# Patient Record
Sex: MALE | Race: BLACK OR AFRICAN AMERICAN | NOT HISPANIC OR LATINO | Employment: UNEMPLOYED | URBAN - METROPOLITAN AREA
[De-identification: names, ages, dates, MRNs, and addresses within clinical notes are randomized per-mention and may not be internally consistent; named-entity substitution may affect disease eponyms.]

---

## 2023-02-23 ENCOUNTER — OFFICE VISIT (OUTPATIENT)
Dept: FAMILY MEDICINE CLINIC | Facility: CLINIC | Age: 53
End: 2023-02-23

## 2023-02-23 ENCOUNTER — APPOINTMENT (OUTPATIENT)
Dept: LAB | Facility: CLINIC | Age: 53
End: 2023-02-23

## 2023-02-23 VITALS
RESPIRATION RATE: 18 BRPM | HEART RATE: 104 BPM | WEIGHT: 217.4 LBS | OXYGEN SATURATION: 98 % | HEIGHT: 74 IN | SYSTOLIC BLOOD PRESSURE: 130 MMHG | BODY MASS INDEX: 27.9 KG/M2 | DIASTOLIC BLOOD PRESSURE: 90 MMHG | TEMPERATURE: 97.4 F

## 2023-02-23 DIAGNOSIS — F51.01 PRIMARY INSOMNIA: ICD-10-CM

## 2023-02-23 DIAGNOSIS — Z00.00 ANNUAL PHYSICAL EXAM: Primary | ICD-10-CM

## 2023-02-23 DIAGNOSIS — Z13.220 SCREENING CHOLESTEROL LEVEL: ICD-10-CM

## 2023-02-23 DIAGNOSIS — F41.8 ANXIETY WITH DEPRESSION: ICD-10-CM

## 2023-02-23 DIAGNOSIS — Z13.29 THYROID DISORDER SCREENING: ICD-10-CM

## 2023-02-23 DIAGNOSIS — Z13.1 DIABETES MELLITUS SCREENING: ICD-10-CM

## 2023-02-23 DIAGNOSIS — Z11.59 NEED FOR HEPATITIS C SCREENING TEST: ICD-10-CM

## 2023-02-23 DIAGNOSIS — Z11.4 SCREENING FOR HIV (HUMAN IMMUNODEFICIENCY VIRUS): ICD-10-CM

## 2023-02-23 DIAGNOSIS — Z12.11 COLON CANCER SCREENING: ICD-10-CM

## 2023-02-23 DIAGNOSIS — Z12.5 PROSTATE CANCER SCREENING: ICD-10-CM

## 2023-02-23 DIAGNOSIS — Z00.00 ANNUAL PHYSICAL EXAM: ICD-10-CM

## 2023-02-23 LAB
ALBUMIN SERPL BCP-MCNC: 3.8 G/DL (ref 3.5–5)
ALP SERPL-CCNC: 57 U/L (ref 46–116)
ALT SERPL W P-5'-P-CCNC: 24 U/L (ref 12–78)
ANION GAP SERPL CALCULATED.3IONS-SCNC: 4 MMOL/L (ref 4–13)
AST SERPL W P-5'-P-CCNC: 22 U/L (ref 5–45)
BASOPHILS # BLD AUTO: 0.05 THOUSANDS/ÂΜL (ref 0–0.1)
BASOPHILS NFR BLD AUTO: 1 % (ref 0–1)
BILIRUB SERPL-MCNC: 0.24 MG/DL (ref 0.2–1)
BUN SERPL-MCNC: 7 MG/DL (ref 5–25)
CALCIUM SERPL-MCNC: 9.7 MG/DL (ref 8.3–10.1)
CHLORIDE SERPL-SCNC: 109 MMOL/L (ref 96–108)
CHOLEST SERPL-MCNC: 199 MG/DL
CO2 SERPL-SCNC: 25 MMOL/L (ref 21–32)
CREAT SERPL-MCNC: 1.1 MG/DL (ref 0.6–1.3)
EOSINOPHIL # BLD AUTO: 0.27 THOUSAND/ÂΜL (ref 0–0.61)
EOSINOPHIL NFR BLD AUTO: 6 % (ref 0–6)
ERYTHROCYTE [DISTWIDTH] IN BLOOD BY AUTOMATED COUNT: 13.2 % (ref 11.6–15.1)
EST. AVERAGE GLUCOSE BLD GHB EST-MCNC: 117 MG/DL
GFR SERPL CREATININE-BSD FRML MDRD: 76 ML/MIN/1.73SQ M
GLUCOSE P FAST SERPL-MCNC: 137 MG/DL (ref 65–99)
HBA1C MFR BLD: 5.7 %
HCT VFR BLD AUTO: 46.5 % (ref 36.5–49.3)
HCV AB SER QL: NORMAL
HDLC SERPL-MCNC: 55 MG/DL
HGB BLD-MCNC: 14.6 G/DL (ref 12–17)
HIV 1+2 AB+HIV1 P24 AG SERPL QL IA: NORMAL
HIV 2 AB SERPL QL IA: NORMAL
HIV1 AB SERPL QL IA: NORMAL
HIV1 P24 AG SERPL QL IA: NORMAL
IMM GRANULOCYTES # BLD AUTO: 0.01 THOUSAND/UL (ref 0–0.2)
IMM GRANULOCYTES NFR BLD AUTO: 0 % (ref 0–2)
LDLC SERPL CALC-MCNC: 104 MG/DL (ref 0–100)
LYMPHOCYTES # BLD AUTO: 2.18 THOUSANDS/ÂΜL (ref 0.6–4.47)
LYMPHOCYTES NFR BLD AUTO: 46 % (ref 14–44)
MCH RBC QN AUTO: 26.9 PG (ref 26.8–34.3)
MCHC RBC AUTO-ENTMCNC: 31.4 G/DL (ref 31.4–37.4)
MCV RBC AUTO: 86 FL (ref 82–98)
MONOCYTES # BLD AUTO: 0.42 THOUSAND/ÂΜL (ref 0.17–1.22)
MONOCYTES NFR BLD AUTO: 9 % (ref 4–12)
NEUTROPHILS # BLD AUTO: 1.77 THOUSANDS/ÂΜL (ref 1.85–7.62)
NEUTS SEG NFR BLD AUTO: 38 % (ref 43–75)
NONHDLC SERPL-MCNC: 144 MG/DL
NRBC BLD AUTO-RTO: 0 /100 WBCS
PLATELET # BLD AUTO: 244 THOUSANDS/UL (ref 149–390)
PMV BLD AUTO: 11.1 FL (ref 8.9–12.7)
POTASSIUM SERPL-SCNC: 3.9 MMOL/L (ref 3.5–5.3)
PROT SERPL-MCNC: 8 G/DL (ref 6.4–8.4)
PSA SERPL-MCNC: 0.8 NG/ML (ref 0–4)
RBC # BLD AUTO: 5.43 MILLION/UL (ref 3.88–5.62)
SODIUM SERPL-SCNC: 138 MMOL/L (ref 135–147)
TRIGL SERPL-MCNC: 200 MG/DL
TSH SERPL DL<=0.05 MIU/L-ACNC: 2.08 UIU/ML (ref 0.45–4.5)
WBC # BLD AUTO: 4.7 THOUSAND/UL (ref 4.31–10.16)

## 2023-02-23 RX ORDER — QUETIAPINE FUMARATE 100 MG/1
100 TABLET, FILM COATED ORAL
COMMUNITY

## 2023-02-23 NOTE — PROGRESS NOTES
[id number]    NAME: Soledad Palacios  AGE: 46 y o  SEX: male  : 1970     DATE: 2023     Assessment and Plan:     Problem List Items Addressed This Visit        Other    Primary insomnia    Anxiety with depression    Relevant Medications    QUEtiapine (SEROquel) 100 mg tablet   Other Visit Diagnoses     Annual physical exam    -  Primary    Relevant Orders    Comprehensive metabolic panel    CBC and differential    Colon cancer screening        Relevant Orders    Ambulatory referral for colonoscopy    Thyroid disorder screening        Relevant Orders    TSH, 3rd generation with Free T4 reflex    Screening cholesterol level        Relevant Orders    Lipid panel    Prostate cancer screening        Relevant Orders    PSA, Total Screen    Diabetes mellitus screening        Relevant Orders    Hemoglobin A1C    Need for hepatitis C screening test        Relevant Orders    Hepatitis C Antibody ([facility])    Screening for HIV (human immunodeficiency virus)        Relevant Orders    HIV 1/2 AG/AB w Reflex [facility] for 2 yr old and above        Physical exam/new patient evaluation completed in office today  Patient has been doing well on Seroquel 100 mg for sleep, anxiety/depression  Follow-up yearly blood work recommended  Immunizations and preventive care screenings were discussed with patient today  Appropriate education was printed on patient's after visit summary  Discussed risks and benefits of prostate cancer screening  We discussed the controversial history of PSA screening for prostate cancer in the [place] as well as the risk of over detection and over treatment of prostate cancer by way of PSA screening    The patient understands that PSA blood testing is an imperfect way to screen for prostate cancer and that elevated PSA levels in the blood may also be caused by infection, inflammation, prostatic trauma or manipulation, urological procedures, or by benign prostatic enlargement  The role of the digital rectal examination in prostate cancer screening was also discussed and I discussed with him that there is large interobserver variability in the findings of digital rectal examination  Counseling:  Alcohol/drug use: discussed moderation in alcohol intake, the recommendations for healthy alcohol use, and avoidance of illicit drug use  Dental Health: discussed importance of regular tooth brushing, flossing, and dental visits  Injury prevention: discussed safety/seat belts, safety helmets, smoke detectors, carbon dioxide detectors, and smoking near bedding or upholstery  Sexual health: discussed sexually transmitted diseases, partner selection, use of condoms, avoidance of unintended pregnancy, and contraceptive alternatives  Exercise: the importance of regular exercise/physical activity was discussed  Recommend exercise 3-5 times per week for at least 30 minutes  BMI Counseling: Body mass index is 28 29 kg/m²  The BMI is above normal  Nutrition recommendations include decreasing portion sizes, encouraging healthy choices of fruits and vegetables and moderation in carbohydrate intake  Exercise recommendations include moderate physical activity 150 minutes/week  Patient referred to PCP  Rationale for BMI follow-up plan is due to patient being overweight or obese  Depression Screening and Follow-up Plan: Patient was screened for depression during today's encounter  They screened negative with a PHQ-2 score of 0  Return in about 1 year (around 2/23/2024), or if symptoms worsen or fail to improve, for Recheck  Chief Complaint:     Chief Complaint   Patient presents with   • Establish Care      History of Present Illness:     Adult Annual Physical   Patient here for a comprehensive physical exam  The patient reports no problems  Diet and Physical Activity  Diet/Nutrition: well balanced diet  Exercise: 1-2 times a week on average  Depression Screening  PHQ-2/9 Depression Screening    Little interest or pleasure in doing things: 0 - not at all  Feeling down, depressed, or hopeless: 0 - not at all  PHQ-2 Score: 0  PHQ-2 Interpretation: Negative depression screen       General Health  Sleep: sleeps well  Hearing: normal - bilateral   Vision: no vision problems  Dental: regular dental visits   Health  Symptoms include: none     Review of Systems:     Review of Systems   Constitutional: Negative for chills, fatigue and fever  HENT: Negative for congestion and sore throat  Eyes: Negative for pain and visual disturbance  Respiratory: Negative for shortness of breath and wheezing  Cardiovascular: Negative for chest pain and palpitations  Gastrointestinal: Negative for abdominal pain, constipation, diarrhea, nausea and vomiting  Genitourinary: Negative for dysuria and frequency  Musculoskeletal: Negative for back pain and neck pain  Skin: Negative for color change and rash  Neurological: Negative for dizziness and headaches  Psychiatric/Behavioral: Negative for agitation and confusion  All other systems reviewed and are negative       Past Medical History:     Past Medical History:   Diagnosis Date   • Insomnia       Past Surgical History:     Past Surgical History:   Procedure Laterality Date   • KNEE SURGERY     • MANDIBLE FRACTURE SURGERY        Family History:     Family History   Problem Relation Age of Onset   • No Known Problems Mother    • No Known Problems Father       Social History:     Social History     Socioeconomic History   • Marital status: Single     Spouse name: None   • Number of children: None   • Years of education: None   • Highest education level: None   Occupational History   • None   Tobacco Use   • Smoking status: Some Days     Types: Cigars     Passive exposure: Never   • Smokeless tobacco: Never   Substance and Sexual Activity   • Alcohol use: Not Currently   • Drug use: Never   • Sexual activity: None   Other Topics Concern   • None   Social History Narrative   • None     Social Determinants of Health     Financial Resource Strain: Not on file   Food Insecurity: Not on file   Transportation Needs: Not on file   Physical Activity: Not on file   Stress: Not on file   Social Connections: Not on file   Intimate Partner Violence: Not on file   Housing Stability: Not on file      Current Medications:     Current Outpatient Medications   Medication Sig Dispense Refill   • QUEtiapine (SEROquel) 100 mg tablet Take 100 mg by mouth daily at bedtime       No current facility-administered medications for this visit  Allergies:     No Known Allergies   Physical Exam:     /90 (BP Location: Left arm, Patient Position: Sitting, Cuff Size: Large)   Pulse 104   Temp (!) 97 4 °F (36 3 °C)   Resp 18   Ht 6' 1 5" (1 867 m)   Wt 98 6 kg (217 lb 6 4 oz)   SpO2 98%   BMI 28 29 kg/m²     Physical Exam  Vitals and nursing note reviewed  Constitutional:       General: He is not in acute distress  Appearance: He is well-developed  HENT:      Head: Normocephalic and atraumatic  Eyes:      General: No scleral icterus  Conjunctiva/sclera: Conjunctivae normal    Cardiovascular:      Rate and Rhythm: Normal rate and regular rhythm  Pulses: Normal pulses  Heart sounds: No murmur heard  Pulmonary:      Effort: Pulmonary effort is normal  No respiratory distress  Breath sounds: Normal breath sounds  Abdominal:      General: Bowel sounds are normal  There is no distension  Palpations: Abdomen is soft  Tenderness: There is no abdominal tenderness  Musculoskeletal:         General: No swelling  Normal range of motion  Cervical back: Neck supple  Skin:     General: Skin is warm and dry  Capillary Refill: Capillary refill takes less than 2 seconds  Neurological:      General: No focal deficit present        Mental Status: He is alert and oriented to person, place, and time  Mental status is at baseline     Psychiatric:         Mood and Affect: Mood normal          Behavior: Behavior normal           Shiloh Francisco DO  [address]

## 2023-07-12 ENCOUNTER — TELEPHONE (OUTPATIENT)
Dept: FAMILY MEDICINE CLINIC | Facility: CLINIC | Age: 53
End: 2023-07-12

## 2023-07-12 NOTE — TELEPHONE ENCOUNTER
Yen Dougherty called from [facility] and scheduled MARTINEZ Dr Tong Part 7/18 at 3:30. Patient is being discharged today, was admitted 7/7 for seizure. Please call patient for TCM.

## 2023-07-17 ENCOUNTER — TRANSITIONAL CARE MANAGEMENT (OUTPATIENT)
Dept: FAMILY MEDICINE CLINIC | Facility: CLINIC | Age: 53
End: 2023-07-17

## 2023-07-17 RX ORDER — AMLODIPINE BESYLATE 10 MG/1
10 TABLET ORAL DAILY
COMMUNITY

## 2023-07-17 RX ORDER — METOPROLOL SUCCINATE 25 MG/1
25 TABLET, EXTENDED RELEASE ORAL DAILY
COMMUNITY

## 2023-07-18 ENCOUNTER — OFFICE VISIT (OUTPATIENT)
Dept: FAMILY MEDICINE CLINIC | Facility: CLINIC | Age: 53
End: 2023-07-18
Payer: MEDICAID

## 2023-07-18 VITALS
BODY MASS INDEX: 24.31 KG/M2 | WEIGHT: 189.4 LBS | HEART RATE: 98 BPM | TEMPERATURE: 98.2 F | RESPIRATION RATE: 16 BRPM | SYSTOLIC BLOOD PRESSURE: 142 MMHG | HEIGHT: 74 IN | DIASTOLIC BLOOD PRESSURE: 92 MMHG

## 2023-07-18 DIAGNOSIS — F41.8 ANXIETY WITH DEPRESSION: ICD-10-CM

## 2023-07-18 DIAGNOSIS — R56.9 SEIZURE (HCC): Primary | ICD-10-CM

## 2023-07-18 DIAGNOSIS — Z12.12 ENCOUNTER FOR COLORECTAL CANCER SCREENING: ICD-10-CM

## 2023-07-18 DIAGNOSIS — I10 PRIMARY HYPERTENSION: ICD-10-CM

## 2023-07-18 DIAGNOSIS — Z12.11 ENCOUNTER FOR COLORECTAL CANCER SCREENING: ICD-10-CM

## 2023-07-18 PROCEDURE — 99495 TRANSJ CARE MGMT MOD F2F 14D: CPT | Performed by: STUDENT IN AN ORGANIZED HEALTH CARE EDUCATION/TRAINING PROGRAM

## 2023-07-18 NOTE — LETTER
July 18, 2023     Patient: Mary Sewell  YOB: 1970  Date of Visit: 7/18/2023      To Whom it May Concern:    Mary Sewell is under my professional care. Jo-Ann Beck was seen in my office on 7/18/2023. Jo-Ann Beck may return to work on 07/19/2023 . If you have any questions or concerns, please don't hesitate to call.          Sincerely,          Yasmin [place],         CC: No Recipients

## 2023-07-18 NOTE — PROGRESS NOTES
Assessment & Plan     1. Seizure [facility])  -     Ambulatory Referral to Neurology; Future    2. Encounter for colorectal cancer screening  -     Ambulatory Referral to Gastroenterology; Future    3. Anxiety with depression    4. Primary hypertension      Patient is a pleasant 61-year-old male coming in for transition of care management after being hospitalized for seizure-like activity, found down at work, elevated CK levels. Per patient MRI brain, EEG, other labs and imaging all negative. Follow-up discharge paperwork from outside facility to confirm. Patient notes no antiepileptic medication given given unremarkable work-up. Potential etiology in the setting of dehydration, patient has been drinking increased fluids and electrolytes, continue at this time. Recommend follow-up in a few months with repeat labs, gastroenterology referral for colonoscopy, referral for neurology recommended. Patient in agreement of plan. Blood pressure slightly elevated today on exam, continue amlodipine, metoprolol succinate, low-dose aspirin therapy. Subjective     Transitional Care Management Review:   Jaime Solomon is a 46 y.o. male here for TCM follow up. During the TCM phone call patient stated:  TCM Call     Date and time call was made  7/17/2023 11:12 AM    Hospital care reviewed  Discussed with Inpatient Physician    Patient was hospitialized at  Other (comment)    [facility]    Date of Admission  07/07/23    Date of discharge  07/14/23    Diagnosis  seizure    Disposition  Home    Were the patients medications reviewed and updated  Yes    Current Symptoms  None      TCM Call     Post hospital issues  None    Should patient be enrolled in anticoag monitoring? No    Scheduled for follow up?   Yes    Did you obtain your prescribed medications  Yes    Do you need help managing your prescriptions or medications  No    Is transportation to your appointment needed  No    Living Arrangements Spouse or Significiant other    Support System  Family    The type of support provided  Emotional    Do you have social support  Yes, as much as I need    Are you recieving any outpatient services  No    Are you recieving home care services  No    Are you using any community resources  No    Current waiver services  No    Have you fallen in the last 12 months  Yes    How many times  1    Interperter language line needed  No    Counseling  Family    Counseling topics  Prognosis; Diagnostic results        Transition of care management, outside facility, will obtain records. Diagnosis seizure-like activity. Patient is currently asymptomatic in office today. Review of Systems   Constitutional: Negative for chills, fatigue and fever. HENT: Negative for congestion and sore throat. Eyes: Negative for pain and visual disturbance. Respiratory: Negative for shortness of breath and wheezing. Cardiovascular: Negative for chest pain and palpitations. Gastrointestinal: Negative for abdominal pain, constipation, diarrhea, nausea and vomiting. Genitourinary: Negative for dysuria and frequency. Musculoskeletal: Negative for back pain and neck pain. Skin: Negative for color change and rash. Neurological: Negative for dizziness and headaches. Psychiatric/Behavioral: Negative for agitation and confusion. All other systems reviewed and are negative. Objective     /92 (BP Location: Left arm, Patient Position: Sitting, Cuff Size: Large)   Pulse 98   Temp 98.2 °F (36.8 °C)   Resp 16   Ht 6' 1.5" (1.867 m)   Wt 85.9 kg (189 lb 6.4 oz)   BMI 24.65 kg/m²      Physical Exam  Vitals and nursing note reviewed. Constitutional:       General: He is not in acute distress. Appearance: He is well-developed. HENT:      Head: Normocephalic and atraumatic. Eyes:      General: No scleral icterus.      Conjunctiva/sclera: Conjunctivae normal.   Cardiovascular:      Rate and Rhythm: Normal rate and regular rhythm. Heart sounds: No murmur heard. Pulmonary:      Effort: Pulmonary effort is normal. No respiratory distress. Breath sounds: Normal breath sounds. Abdominal:      General: Bowel sounds are normal. There is no distension. Palpations: Abdomen is soft. Tenderness: There is no abdominal tenderness. Musculoskeletal:         General: No swelling. Normal range of motion. Cervical back: Neck supple. Skin:     General: Skin is warm and dry. Capillary Refill: Capillary refill takes less than 2 seconds. Neurological:      General: No focal deficit present. Mental Status: He is alert and oriented to person, place, and time. Mental status is at baseline.    Psychiatric:         Mood and Affect: Mood normal.         Behavior: Behavior normal.       Medications have been reviewed by provider in current encounter    Lindsey Casey DO

## 2023-07-19 ENCOUNTER — TELEPHONE (OUTPATIENT)
Dept: ADMINISTRATIVE | Facility: OTHER | Age: 53
End: 2023-07-19

## 2023-07-19 NOTE — TELEPHONE ENCOUNTER
----- Message from Jose Smith, [facility] sent at 7/18/2023  3:35 PM EDT -----  Regarding: covid vaccines  07/18/23 3:35 PM    Hello, our patient attached above has had covid vaccine completed/performed. Please assist in [facility] [address] [facility] The date of service is 2021.     Thank you,  Meron SEAY

## 2023-07-19 NOTE — TELEPHONE ENCOUNTER
Upon review of the In Basket request we found [facility] locations in [place], [place] and [place], [place] but nothing on [address] in [place], [place]    Any additional questions or concerns should be emailed to [employer] via the appropriate education email address, please do not reply via In Basket.     Thank you  Adam Bravo

## 2023-09-28 ENCOUNTER — OFFICE VISIT (OUTPATIENT)
Dept: FAMILY MEDICINE CLINIC | Facility: CLINIC | Age: 53
End: 2023-09-28
Payer: MEDICAID

## 2023-09-28 VITALS
RESPIRATION RATE: 18 BRPM | DIASTOLIC BLOOD PRESSURE: 80 MMHG | WEIGHT: 187.8 LBS | SYSTOLIC BLOOD PRESSURE: 138 MMHG | HEIGHT: 74 IN | BODY MASS INDEX: 24.1 KG/M2 | TEMPERATURE: 98.3 F | OXYGEN SATURATION: 98 % | HEART RATE: 84 BPM

## 2023-09-28 DIAGNOSIS — F51.01 PRIMARY INSOMNIA: ICD-10-CM

## 2023-09-28 DIAGNOSIS — F41.8 ANXIETY WITH DEPRESSION: ICD-10-CM

## 2023-09-28 DIAGNOSIS — I10 PRIMARY HYPERTENSION: ICD-10-CM

## 2023-09-28 DIAGNOSIS — R55 SYNCOPE, UNSPECIFIED SYNCOPE TYPE: Primary | ICD-10-CM

## 2023-09-28 PROCEDURE — 99214 OFFICE O/P EST MOD 30 MIN: CPT | Performed by: STUDENT IN AN ORGANIZED HEALTH CARE EDUCATION/TRAINING PROGRAM

## 2023-09-28 RX ORDER — QUETIAPINE FUMARATE 50 MG/1
50 TABLET, FILM COATED ORAL
Qty: 90 TABLET | Refills: 0 | Status: SHIPPED | OUTPATIENT
Start: 2023-09-28

## 2023-09-28 NOTE — PROGRESS NOTES
Clinic Visit Note  Kathy Landa 48 y.o. male   MRN: [id number]    Assessment and Plan      Diagnoses and all orders for this visit:    Syncope, unspecified syncope type  Given symptoms, rule out cardiac etiology, cardiology evaluation appreciated, echocardiogram recommended to rule out valvular disease and EF function. Continue to stay well-hydrated, no recent symptoms.  -     Echo complete w/ contrast if indicated; Future  -     Ambulatory Referral to Cardiology; Future    Primary hypertension  Patient has been doing well on amlodipine and Toprol-XL, if blood pressure is not controlled we can think about ARB therapy    Anxiety with depression  Positive home/social stressors, patient has been on Seroquel in the past which has helped with primary insomnia, no other medications that have worked well for patient, recommend low-dose Seroquel restart with evaluation afterwards, routine EKG to monitor QTc.  -     QUEtiapine (SEROquel) 50 mg tablet; Take 1 tablet (50 mg total) by mouth daily at bedtime    Primary insomnia  -     QUEtiapine (SEROquel) 50 mg tablet; Take 1 tablet (50 mg total) by mouth daily at bedtime      My impressions and treatment recommendations were discussed in detail with the patient who verbalized understanding and had no further questions. Discharge instructions were provided. Subjective     Chief Complaint:  F/U    History of Present Illness:    Patient is a pleasant 51-year-old male coming in for observational follow-up with outside network secondary to syncopal episode, appears to be driven by stress, patient recently was laid off at time of incident. Do not have records given that this was an outside facility but patient notes that he was told that his heart is strong, does not appear to be seizure related as suspected earlier with a negative work-up, may have been dehydrated/stress state.     The following portions of the patient's history were reviewed and updated as appropriate: allergies, current medications, past family history, past medical history, past social history, past surgical history and problem list.    REVIEW OF SYSTEMS:  A complete 12-point review of systems is negative other than that noted in the HPI. Review of Systems   Constitutional: Negative for chills, fatigue and fever. HENT: Negative for congestion and sore throat. Eyes: Negative for pain and visual disturbance. Respiratory: Negative for shortness of breath and wheezing. Cardiovascular: Negative for chest pain and palpitations. Gastrointestinal: Negative for abdominal pain, constipation, diarrhea, nausea and vomiting. Genitourinary: Negative for dysuria and frequency. Musculoskeletal: Negative for back pain and neck pain. Skin: Negative for color change and rash. Neurological: Negative for dizziness and headaches. Psychiatric/Behavioral: Negative for agitation and confusion. All other systems reviewed and are negative.         Current Outpatient Medications:   •  amLODIPine (NORVASC) 10 mg tablet, Take 10 mg by mouth daily, Disp: , Rfl:   •  aspirin (ECOTRIN LOW STRENGTH) 81 mg EC tablet, Take 81 mg by mouth daily, Disp: , Rfl:   •  metoprolol succinate (Toprol XL) 25 mg 24 hr tablet, Take 25 mg by mouth daily, Disp: , Rfl:   •  QUEtiapine (SEROquel) 50 mg tablet, Take 1 tablet (50 mg total) by mouth daily at bedtime, Disp: 90 tablet, Rfl: 0  Past Medical History:   Diagnosis Date   • Insomnia      Past Surgical History:   Procedure Laterality Date   • KNEE SURGERY     • MANDIBLE FRACTURE SURGERY       Social History     Socioeconomic History   • Marital status: Single     Spouse name: Not on file   • Number of children: Not on file   • Years of education: Not on file   • Highest education level: Not on file   Occupational History   • Not on file   Tobacco Use   • Smoking status: Some Days     Types: Cigars     Passive exposure: Never   • Smokeless tobacco: Never   Substance and Sexual Activity   • Alcohol use: Not Currently   • Drug use: Never   • Sexual activity: Not on file   Other Topics Concern   • Not on file   Social History Narrative   • Not on file     Social Determinants of Health     Financial Resource Strain: Not on file   Food Insecurity: Not on file   Transportation Needs: Not on file   Physical Activity: Not on file   Stress: Not on file   Social Connections: Not on file   Intimate Partner Violence: Not on file   Housing Stability: Not on file     Family History   Problem Relation Age of Onset   • No Known Problems Mother    • No Known Problems Father      No Known Allergies    Objective     Vitals:    09/28/23 1151   BP: 138/80   BP Location: Left arm   Patient Position: Sitting   Cuff Size: Large   Pulse: 84   Resp: 18   Temp: 98.3 °F (36.8 °C)   SpO2: 98%   Weight: 85.2 kg (187 lb 12.8 oz)   Height: 6' 1.5" (1.867 m)       Physical Exam:     GENERAL: NAD, pleasant   HEENT:  NC/AT, PERRL, EOMI, no scleral icterus  CARDIAC:  RRR, +S1/S2, no S3/S4 appreciated, no m/g/r  PULMONARY:  CTA B/L, no wheezing/rales/rhonci, non-labored breathing  ABDOMEN:  Soft, NT/ND, no rebound/guarding/rigidity  Extremities:. No edema, cyanosis, or clubbing  Musculoskeletal:  Full range of motion intact in all extremities   NEUROLOGIC: Grossly intact, no focal deficits  SKIN:  No rashes or erythema noted on exposed skin  Psych: Normal affect, mood stable    ==  PLEASE NOTE:  This encounter was completed utilizing the [employer] Direct Speech Voice Recognition Software. Grammatical errors, random word insertions, pronoun errors and incomplete sentences are occasional consequences of the system due to software limitations, ambient noise and hardware issues. These may be missed by proof reading prior to affixing electronic signature.  Any questions or concerns about the content, text or information contained within the body of this dictation should be directly addressed to the physician for clarification. Please do not hesitate to call me directly if you have any any questions or concerns.     DO Moe Garcia [facility]   [facility]

## 2023-09-28 NOTE — LETTER
September 28, 2023     Patient: Kathy Landa  YOB: 1970  Date of Visit: 9/28/2023      To Whom it May Concern:    Kathy Landa is under my professional care. Marina Hall was seen in my office on 9/28/2023. Marina Hall may return to work on 10/05/2023 . If you have any questions or concerns, please don't hesitate to call.          Sincerely,          Lo Faust,         CC: No Recipients

## 2023-10-10 ENCOUNTER — TELEPHONE (OUTPATIENT)
Dept: GASTROENTEROLOGY | Facility: CLINIC | Age: 53
End: 2023-10-10

## 2023-10-10 NOTE — TELEPHONE ENCOUNTER
Received order in system from Dr. Jose Lim for pt to be scheduled for a colonoscopy screening with Dr. Cheryl Ji. I lmom for pt to please call back to schedule. Will call again in one week if do not hear back from pt.

## 2023-10-20 ENCOUNTER — TELEPHONE (OUTPATIENT)
Dept: FAMILY MEDICINE CLINIC | Facility: CLINIC | Age: 53
End: 2023-10-20

## 2023-10-20 NOTE — TELEPHONE ENCOUNTER
Dr. Jose Arevalo:    Patient came into office stating that he went to cardiologist in [place] on 10/12. Please c/b to discuss further.

## 2023-10-23 NOTE — TELEPHONE ENCOUNTER
Left detailed message on patient personal voicemail for patient to follow up with cardio and # to call central scheduling to schedule echo.

## 2024-10-07 ENCOUNTER — TELEPHONE (OUTPATIENT)
Age: 54
End: 2024-10-07

## 2024-10-07 NOTE — TELEPHONE ENCOUNTER
Patient and  Rachel called in asking if a copy of patients drivers license could be faxed over as patient has lost his license. Rachel stated they are working on an application to extend patients temporary housing and need a copy of license. Rachel asked if this could be faxed to [phone].    Please fax, thank you

## 2024-10-07 NOTE — TELEPHONE ENCOUNTER
Sent to wrong office. Patient's PCP is Dr. Calvillo at [facility]. Re-routing to correct office

## 2024-10-08 NOTE — TELEPHONE ENCOUNTER
Never received fax.  Requested to be re-faxed to [phone] and also e-mail to:    [email or web address]

## 2024-10-11 ENCOUNTER — TELEPHONE (OUTPATIENT)
Age: 54
End: 2024-10-11

## 2024-10-11 NOTE — TELEPHONE ENCOUNTER
Patient called with his  named Delvis with [facility]. They are asking about his pre-op clearance. I advised that his appointment scheduled for 10/14 is a physical not a pre-op clearance. He states that he is scheduled for brain surgery on 10/17 at [facility]. He does not know what procedure he is getting done or who the performing surgeon is.     He states that he can call back and get that information when he gets back to his hotel room (he was currently with the ).      Attempt to call office to notify of new information but line was busy - I left the appointment scheduled as Physical - will wait until he calls back for further information.     Please be advised

## 2024-10-14 ENCOUNTER — OFFICE VISIT (OUTPATIENT)
Dept: FAMILY MEDICINE CLINIC | Facility: CLINIC | Age: 54
End: 2024-10-14
Payer: MEDICAID

## 2024-10-14 VITALS
WEIGHT: 172 LBS | DIASTOLIC BLOOD PRESSURE: 80 MMHG | HEIGHT: 73 IN | OXYGEN SATURATION: 99 % | TEMPERATURE: 98.1 F | BODY MASS INDEX: 22.8 KG/M2 | HEART RATE: 84 BPM | SYSTOLIC BLOOD PRESSURE: 130 MMHG | RESPIRATION RATE: 14 BRPM

## 2024-10-14 DIAGNOSIS — S06.5XAA BILATERAL SUBDURAL HEMATOMAS (HCC): ICD-10-CM

## 2024-10-14 DIAGNOSIS — Z00.00 ANNUAL PHYSICAL EXAM: Primary | ICD-10-CM

## 2024-10-14 DIAGNOSIS — Z01.818 PRE-OP EXAMINATION: ICD-10-CM

## 2024-10-14 DIAGNOSIS — F51.01 PRIMARY INSOMNIA: ICD-10-CM

## 2024-10-14 DIAGNOSIS — I10 PRIMARY HYPERTENSION: ICD-10-CM

## 2024-10-14 DIAGNOSIS — F41.8 ANXIETY WITH DEPRESSION: ICD-10-CM

## 2024-10-14 PROCEDURE — 99396 PREV VISIT EST AGE 40-64: CPT | Performed by: STUDENT IN AN ORGANIZED HEALTH CARE EDUCATION/TRAINING PROGRAM

## 2024-10-14 PROCEDURE — 93000 ELECTROCARDIOGRAM COMPLETE: CPT | Performed by: STUDENT IN AN ORGANIZED HEALTH CARE EDUCATION/TRAINING PROGRAM

## 2024-10-14 PROCEDURE — 99214 OFFICE O/P EST MOD 30 MIN: CPT | Performed by: STUDENT IN AN ORGANIZED HEALTH CARE EDUCATION/TRAINING PROGRAM

## 2024-10-14 RX ORDER — LEVETIRACETAM 750 MG/1
750 TABLET ORAL 2 TIMES DAILY
Qty: 90 TABLET | Refills: 0 | Status: SHIPPED | OUTPATIENT
Start: 2024-10-14 | End: 2024-10-14 | Stop reason: SDUPTHER

## 2024-10-14 RX ORDER — METOPROLOL SUCCINATE 25 MG/1
25 TABLET, EXTENDED RELEASE ORAL DAILY
Qty: 90 TABLET | Refills: 0 | Status: SHIPPED | OUTPATIENT
Start: 2024-10-14

## 2024-10-14 RX ORDER — LEVETIRACETAM 750 MG/1
750 TABLET ORAL 2 TIMES DAILY
Qty: 90 TABLET | Refills: 0 | Status: SHIPPED | OUTPATIENT
Start: 2024-10-14

## 2024-10-14 NOTE — PATIENT INSTRUCTIONS
"Patient Education     Routine physical for adults   The Basics   Written by the doctors and editors at [facility]   What is a physical? -- A physical is a routine visit, or \"check-up,\" with your doctor. You might also hear it called a \"wellness visit\" or \"preventive visit.\"  During each visit, the doctor will:   Ask about your physical and mental health   Ask about your habits, behaviors, and lifestyle   Do an exam   Give you vaccines if needed   Talk to you about any medicines you take   Give advice about your health   Answer your questions  Getting regular check-ups is an important part of taking care of your health. It can help your doctor find and treat any problems you have. But it's also important for preventing health problems.  A routine physical is different from a \"sick visit.\" A sick visit is when you see a doctor because of a health concern or problem. Since physicals are scheduled ahead of time, you can think about what you want to ask the doctor.  How often should I get a physical? -- It depends on your age and health. In general, for people age 21 years and older:   If you are younger than 50 years, you might be able to get a physical every 3 years.   If you are 50 years or older, your doctor might recommend a physical every year.  If you have an ongoing health condition, like diabetes or high blood pressure, your doctor will probably want to see you more often.  What happens during a physical? -- In general, each visit will include:   Physical exam - The doctor or nurse will check your height, weight, heart rate, and blood pressure. They will also look at your eyes and ears. They will ask about how you are feeling and whether you have any symptoms that bother you.   Medicines - It's a good idea to bring a list of all the medicines you take to each doctor visit. Your doctor will talk to you about your medicines and answer any questions. Tell them if you are having any side effects that bother you. You " "should also tell them if you are having trouble paying for any of your medicines.   Habits and behaviors - This includes:   Your diet   Your exercise habits   Whether you smoke, drink alcohol, or use drugs   Whether you are sexually active   Whether you feel safe at home  Your doctor will talk to you about things you can do to improve your health and lower your risk of health problems. They will also offer help and support. For example, if you want to quit smoking, they can give you advice and might prescribe medicines. If you want to improve your diet or get more physical activity, they can help you with this, too.   Lab tests, if needed - The tests you get will depend on your age and situation. For example, your doctor might want to check your:   Cholesterol   Blood sugar   Iron level   Vaccines - The recommended vaccines will depend on your age, health, and what vaccines you already had. Vaccines are very important because they can prevent certain serious or deadly infections.   Discussion of screening - \"Screening\" means checking for diseases or other health problems before they cause symptoms. Your doctor can recommend screening based on your age, risk, and preferences. This might include tests to check for:   Cancer, such as breast, prostate, cervical, ovarian, colorectal, prostate, lung, or skin cancer   Sexually transmitted infections, such as chlamydia and gonorrhea   Mental health conditions like depression and anxiety  Your doctor will talk to you about the different types of screening tests. They can help you decide which screenings to have. They can also explain what the results might mean.   Answering questions - The physical is a good time to ask the doctor or nurse questions about your health. If needed, they can refer you to other doctors or specialists, too.  Adults older than 65 years often need other care, too. As you get older, your doctor will talk to you about:   How to prevent falling at " home   Hearing or vision tests   Memory testing   How to take your medicines safely   Making sure that you have the help and support you need at home  All topics are updated as new evidence becomes available and our peer review process is complete.  This topic retrieved from [employer] on: May 02, 2024.  Topic 124112 Version 1.0  Release: 32.4.3 - C32.122  © 2024 UpToDate, Inc. and/or its affiliates. All rights reserved.  Consumer Information Use and Disclaimer   Disclaimer: This generalized information is a limited summary of diagnosis, treatment, and/or medication information. It is not meant to be comprehensive and should be used as a tool to help the user understand and/or assess potential diagnostic and treatment options. It does NOT include all information about conditions, treatments, medications, side effects, or risks that may apply to a specific patient. It is not intended to be medical advice or a substitute for the medical advice, diagnosis, or treatment of a health care provider based on the health care provider's examination and assessment of a patient's specific and unique circumstances. Patients must speak with a health care provider for complete information about their health, medical questions, and treatment options, including any risks or benefits regarding use of medications. This information does not endorse any treatments or medications as safe, effective, or approved for treating a specific patient. UpToDate, Inc. and its affiliates disclaim any warranty or liability relating to this information or the use thereof.The use of this information is governed by the Terms of Use, available at https://www.woltersVilynxuwer.com/en/know/clinical-effectiveness-terms. 2024© UpToDate, Inc. and its affiliates and/or licensors. All rights reserved.  Copyright   © 2024 UpToDate, Inc. and/or its affiliates. All rights reserved.

## 2024-10-14 NOTE — ASSESSMENT & PLAN NOTE
Orders:    levETIRAcetam (Keppra) 750 mg tablet; Take 1 tablet (750 mg total) by mouth 2 (two) times a day

## 2024-10-14 NOTE — TELEPHONE ENCOUNTER
Called patient, # not in service. Patient has 11:15 appointment today.   Muscle Hinge Flap Text: The defect edges were debeveled with a #15 scalpel blade.  Given the size, depth and location of the defect and the proximity to free margins a muscle hinge flap was deemed most appropriate.  Using a sterile surgical marker, an appropriate hinge flap was drawn incorporating the defect. The area thus outlined was incised with a #15 scalpel blade.  The skin margins were undermined to an appropriate distance in all directions utilizing iris scissors.

## 2024-10-14 NOTE — PROGRESS NOTES
Adult Annual Physical  Name: Sergey Downing      : 1970      MRN: [id number]  Encounter Provider: Ildefonso Calvillo DO  Encounter Date: 10/14/2024   Encounter department: [facility]    Assessment & Plan  Annual physical exam         Pre-op examination    Orders:    POCT ECG    Bilateral subdural hematomas (HCC)    Orders:    levETIRAcetam (Keppra) 750 mg tablet; Take 1 tablet (750 mg total) by mouth 2 (two) times a day    Anxiety with depression           Primary insomnia         Primary hypertension    Orders:    metoprolol succinate (Toprol XL) 25 mg 24 hr tablet; Take 1 tablet (25 mg total) by mouth daily      Patient is a pleasant 54-year-old male presenting for annual physical and preoperative examination for neurological procedure.  Patient notes he recently has had blood work, no acute concerns, if neurosurgery would like updated blood work I have no problem ordering for patient at this time.  EKG reviewed in office, normal sinus rhythm with incomplete right bundle branch block, no signs of ischemia, intervals appropriate.  Patient feels well, no acute concerns, previous surgery for bilateral subdural hematomas.  No focal neurological deficits.  Recommend continuing Keppra due to concern for seizure?  Continue Toprol extended release 25 mg daily, blood pressure well-controlled.  Based on information provided, patient is medically optimized for planned procedure.  Recommend follow-up 2 weeks after surgical procedure at [facility] 10/17/2024.      Immunizations and preventive care screenings were discussed with patient today. Appropriate education was printed on patient's after visit summary.    Discussed risks and benefits of prostate cancer screening. We discussed the controversial history of PSA screening for prostate cancer in the [place] as well as the risk of over detection and over treatment of prostate cancer by way of PSA screening.  The patient understands that PSA  blood testing is an imperfect way to screen for prostate cancer and that elevated PSA levels in the blood may also be caused by infection, inflammation, prostatic trauma or manipulation, urological procedures, or by benign prostatic enlargement.    The role of the digital rectal examination in prostate cancer screening was also discussed and I discussed with him that there is large interobserver variability in the findings of digital rectal examination.    Counseling:  Alcohol/drug use: discussed moderation in alcohol intake, the recommendations for healthy alcohol use, and avoidance of illicit drug use.  Dental Health: discussed importance of regular tooth brushing, flossing, and dental visits.  Injury prevention: discussed safety/seat belts, safety helmets, smoke detectors, carbon monoxide detectors, and smoking near bedding or upholstery.  Sexual health: discussed sexually transmitted diseases, partner selection, use of condoms, avoidance of unintended pregnancy, and contraceptive alternatives.  Exercise: the importance of regular exercise/physical activity was discussed. Recommend exercise 3-5 times per week for at least 30 minutes.       Depression Screening and Follow-up Plan: Patient was screened for depression during today's encounter. They screened negative with a PHQ-9 score of 0.    Tobacco Cessation Counseling: Tobacco cessation counseling was provided. The patient is sincerely urged to quit consumption of tobacco. He is ready to quit tobacco.         History of Present Illness     Adult Annual Physical:  Patient presents for annual physical.     Diet and Physical Activity:  - Diet/Nutrition: well balanced diet.  - Exercise: 1-2 times a week on average.    Depression Screening:    - PHQ-9 Score: 0    General Health:  - Sleep: sleeps well.  - Hearing: normal hearing bilateral ears.  - Vision: no vision problems.  - Dental: regular dental visits.     Health:  - History of STDs: no.   - Urinary symptoms:  "none.     Review of Systems   Constitutional:  Negative for chills, fatigue and fever.   HENT:  Negative for congestion and sore throat.    Eyes:  Negative for pain and visual disturbance.   Respiratory:  Negative for shortness of breath and wheezing.    Cardiovascular:  Negative for chest pain and palpitations.   Gastrointestinal:  Negative for abdominal pain, constipation, diarrhea, nausea and vomiting.   Genitourinary:  Negative for dysuria and frequency.   Musculoskeletal:  Negative for back pain and neck pain.   Skin:  Negative for color change and rash.   Neurological:  Negative for dizziness and headaches.   Psychiatric/Behavioral:  Negative for agitation and confusion.    All other systems reviewed and are negative.        Objective     /80 (BP Location: Left arm, Patient Position: Sitting, Cuff Size: Standard)   Pulse 84   Temp 98.1 °F (36.7 °C) (Temporal)   Resp 14   Ht 6' 1\" (1.854 m)   Wt 78 kg (172 lb)   SpO2 99%   BMI 22.69 kg/m²     Physical Exam  Vitals and nursing note reviewed.   Constitutional:       General: He is not in acute distress.     Appearance: He is well-developed.   HENT:      Head: Normocephalic and atraumatic.   Eyes:      General: No scleral icterus.     Conjunctiva/sclera: Conjunctivae normal.   Cardiovascular:      Rate and Rhythm: Normal rate and regular rhythm.      Pulses: Normal pulses.      Heart sounds: No murmur heard.  Pulmonary:      Effort: Pulmonary effort is normal. No respiratory distress.      Breath sounds: Normal breath sounds.   Abdominal:      General: Bowel sounds are normal. There is no distension.      Palpations: Abdomen is soft.      Tenderness: There is no abdominal tenderness.   Musculoskeletal:         General: No swelling. Normal range of motion.      Cervical back: Neck supple.   Skin:     General: Skin is warm and dry.      Capillary Refill: Capillary refill takes less than 2 seconds.   Neurological:      General: No focal deficit present. "      Mental Status: He is alert and oriented to person, place, and time. Mental status is at baseline.   Psychiatric:         Mood and Affect: Mood normal.         Behavior: Behavior normal.

## 2024-10-14 NOTE — ASSESSMENT & PLAN NOTE
Orders:    metoprolol succinate (Toprol XL) 25 mg 24 hr tablet; Take 1 tablet (25 mg total) by mouth daily

## 2024-10-22 ENCOUNTER — TELEPHONE (OUTPATIENT)
Age: 54
End: 2024-10-22

## 2024-10-22 DIAGNOSIS — S06.5XAA BILATERAL SUBDURAL HEMATOMAS (HCC): Primary | ICD-10-CM

## 2024-10-22 NOTE — TELEPHONE ENCOUNTER
Ani from Dr. Jake Pham office (Neuro surgeon in [place]), would like if PCP Dr. Calvillo can order pt a cat scan of the brain and have this pre certified. This is the patients second brain surgery and do to his situation per Tasiai this was a courtesy of Dr. Joseph but since he does not take medicare he is asking if PCP can order this test. Patient will also need to follow up with neurosurgeon that accepts medicare.   [phone] is the phone number for Dr. Dallin Pham office. Pt is currently staying at [place] [address] [phone]  room [address].    Ani will also be faxing multiple pages regarding his emergency surgery.

## 2024-10-23 ENCOUNTER — TELEPHONE (OUTPATIENT)
Dept: FAMILY MEDICINE CLINIC | Facility: CLINIC | Age: 54
End: 2024-10-23

## 2024-10-23 NOTE — TELEPHONE ENCOUNTER
Dr. Calvillo order a ct brain for patient.  He will be going to [facility] in [facility] [phone] tin [phone]

## 2024-10-25 ENCOUNTER — TELEPHONE (OUTPATIENT)
Age: 54
End: 2024-10-25

## 2024-10-25 NOTE — TELEPHONE ENCOUNTER
We received a call from [employer] informing that he will send a form that needs the doctor's signature on the day of the brain CT.

## 2024-10-28 ENCOUNTER — TELEPHONE (OUTPATIENT)
Age: 54
End: 2024-10-28

## 2024-10-28 NOTE — TELEPHONE ENCOUNTER
Marianela sheehan from Dr. Pham's office,(neurosurgeon) patient is scheduled for CT scan on 11/1/24, to be there by 12:45 at [address] [place] [address] [place]

## 2024-10-28 NOTE — TELEPHONE ENCOUNTER
Marianela from Dr. Jake Pham office (Neuro surgeon in [place])  called because she would like a copy of the CT scan referral and the approval faxed to the number she provided below. Thank you.    Fax:[phone]

## 2024-11-05 ENCOUNTER — TELEMEDICINE (OUTPATIENT)
Dept: FAMILY MEDICINE CLINIC | Facility: CLINIC | Age: 54
End: 2024-11-05
Payer: COMMERCIAL

## 2024-11-05 DIAGNOSIS — I10 PRIMARY HYPERTENSION: ICD-10-CM

## 2024-11-05 DIAGNOSIS — S32.010A CLOSED COMPRESSION FRACTURE OF BODY OF L1 VERTEBRA (HCC): ICD-10-CM

## 2024-11-05 DIAGNOSIS — S06.5XAA BILATERAL SUBDURAL HEMATOMAS (HCC): Primary | ICD-10-CM

## 2024-11-05 DIAGNOSIS — F51.01 PRIMARY INSOMNIA: ICD-10-CM

## 2024-11-05 DIAGNOSIS — F41.8 ANXIETY WITH DEPRESSION: ICD-10-CM

## 2024-11-05 PROCEDURE — 99213 OFFICE O/P EST LOW 20 MIN: CPT | Performed by: STUDENT IN AN ORGANIZED HEALTH CARE EDUCATION/TRAINING PROGRAM

## 2024-11-05 NOTE — PROGRESS NOTES
Virtual Brief Visit  Name: Sergey Downing      : 1970      MRN: [id number]  Encounter Provider: Ildefonso Calvillo DO  Encounter Date: 2024   Encounter department: [facility]    This Visit is being completed by telephone. The Patient is located at Home and in the following state in which I hold an active license [place]    The patient was identified by name and date of birth. Sergey Downing was informed that this is a telemedicine visit and that the visit is being conducted through Telephone.  My office door was closed. No one else was in the room.  He acknowledged consent and understanding of privacy and security of the video platform. The patient has agreed to participate and understands they can discontinue the visit at any time.    Patient is aware this is a billable service.     Assessment & Plan  Bilateral subdural hematomas (HCC)    Orders:    Ambulatory Referral to Neurology; Future    Ambulatory Referral to Neurosurgery; Future    Primary hypertension         Anxiety with depression           Primary insomnia         Closed compression fracture of body of L1 vertebra (HCC)           Patient is a pleasant 54-year-old male coming in for virtual visit, follow-up after bilateral subdural hematomas requiring neurosurgery.    There seems to be some concern with insurance and who patient can follow-up, recommend following up with [facility] neurosurgery and neurology, patient does have follow-up with social care support on 2024.    Patient notes no acute concerns today over the phone.    Continue taking Keppra and Toprol extended release, patient notes that blood pressure has been stable.    Continue close follow-up with patient, will need specialty support, close follow-up in office.         History of Present Illness   F/U medication    Visit Time  Total Visit Duration: 16 minutes total time

## 2024-11-05 NOTE — ASSESSMENT & PLAN NOTE
Orders:    Ambulatory Referral to Neurology; Future    Ambulatory Referral to Neurosurgery; Future

## 2024-11-13 ENCOUNTER — TELEPHONE (OUTPATIENT)
Age: 54
End: 2024-11-13

## 2024-11-13 NOTE — TELEPHONE ENCOUNTER
We received  a call from the patient's care company to inform Dr. Calvillo that the patient lost his wallet including all his documents and the patient will need a letter to take to [employer].    At the time of is appointment.

## 2024-11-19 ENCOUNTER — OFFICE VISIT (OUTPATIENT)
Dept: FAMILY MEDICINE CLINIC | Facility: CLINIC | Age: 54
End: 2024-11-19
Payer: COMMERCIAL

## 2024-11-19 VITALS
SYSTOLIC BLOOD PRESSURE: 140 MMHG | RESPIRATION RATE: 18 BRPM | TEMPERATURE: 98 F | DIASTOLIC BLOOD PRESSURE: 92 MMHG | HEIGHT: 73 IN | BODY MASS INDEX: 22.88 KG/M2 | WEIGHT: 172.6 LBS | HEART RATE: 81 BPM | OXYGEN SATURATION: 98 %

## 2024-11-19 DIAGNOSIS — I10 PRIMARY HYPERTENSION: Primary | ICD-10-CM

## 2024-11-19 DIAGNOSIS — F51.01 PRIMARY INSOMNIA: ICD-10-CM

## 2024-11-19 DIAGNOSIS — S32.010A CLOSED COMPRESSION FRACTURE OF BODY OF L1 VERTEBRA (HCC): ICD-10-CM

## 2024-11-19 DIAGNOSIS — F41.8 ANXIETY WITH DEPRESSION: ICD-10-CM

## 2024-11-19 DIAGNOSIS — N52.9 ERECTILE DYSFUNCTION, UNSPECIFIED ERECTILE DYSFUNCTION TYPE: ICD-10-CM

## 2024-11-19 DIAGNOSIS — S06.5XAA BILATERAL SUBDURAL HEMATOMAS (HCC): ICD-10-CM

## 2024-11-19 PROCEDURE — 99214 OFFICE O/P EST MOD 30 MIN: CPT | Performed by: STUDENT IN AN ORGANIZED HEALTH CARE EDUCATION/TRAINING PROGRAM

## 2024-11-19 RX ORDER — ACETAMINOPHEN 325 MG/1
650 TABLET ORAL EVERY 6 HOURS PRN
COMMUNITY
Start: 2024-11-15

## 2024-11-19 RX ORDER — LIDOCAINE 50 MG/G
1 PATCH TOPICAL DAILY
COMMUNITY
Start: 2024-11-15 | End: 2024-11-20

## 2024-11-19 RX ORDER — AMLODIPINE BESYLATE 5 MG/1
5 TABLET ORAL DAILY
COMMUNITY
Start: 2024-09-20 | End: 2024-11-19

## 2024-11-19 RX ORDER — METHOCARBAMOL 750 MG/1
750 TABLET, FILM COATED ORAL 4 TIMES DAILY
COMMUNITY
Start: 2024-11-15 | End: 2024-11-20

## 2024-11-19 RX ORDER — AMLODIPINE BESYLATE 5 MG/1
5 TABLET ORAL DAILY
Qty: 90 TABLET | Refills: 3 | Status: SHIPPED | OUTPATIENT
Start: 2024-11-19 | End: 2025-11-14

## 2024-11-19 RX ORDER — OXYCODONE AND ACETAMINOPHEN 5; 325 MG/1; MG/1
1 TABLET ORAL EVERY 4 HOURS PRN
COMMUNITY
Start: 2024-09-19

## 2024-11-19 NOTE — LETTER
November 19, 2024     Patient: Sergey Downing  YOB: 1970  Date of Visit: 11/19/2024      To Whom it May Concern:    Sergey Downing is under my professional care, patient identification reviewed/confirmed.      If you have any questions or concerns, please don't hesitate to call.         Sincerely,          Ildefonso Calvillo, DO        CC: No Recipients

## 2024-11-19 NOTE — PROGRESS NOTES
Clinic Visit Note  Sergey Downing 54 y.o. male   MRN: [id number]    Assessment and Plan      Diagnoses and all orders for this visit:    Primary hypertension  -     amLODIPine (NORVASC) 5 mg tablet; Take 1 tablet (5 mg total) by mouth daily    Bilateral subdural hematomas (HCC)  -     Ambulatory Referral to Neurology; Future    Closed compression fracture of body of L1 vertebra (HCC)    Primary insomnia    Anxiety with depression    Erectile dysfunction, unspecified erectile dysfunction type    Other orders  -     acetaminophen (TYLENOL) 325 mg tablet; Take 650 mg by mouth every 6 (six) hours as needed  -     Discontinue: amLODIPine (NORVASC) 5 mg tablet; Take 5 mg by mouth daily (Patient not taking: Reported on 11/19/2024)  -     lidocaine (LIDODERM) 5 %; Place 1 patch on the skin daily (Patient not taking: Reported on 11/19/2024)  -     methocarbamol (ROBAXIN) 750 mg tablet; Take 750 mg by mouth 4 (four) times a day  -     oxyCODONE-acetaminophen (PERCOCET) 5-325 mg per tablet; Take 1 tablet by mouth every 4 (four) hours as needed (Patient not taking: Reported on 11/19/2024)      Patient is a pleasant 54-year-old male coming in for follow-up after emergency room evaluation, s/p mechanical fall.  CT imaging of head shows recent bilateral subdural hematoma surgical procedure, case was discussed with neurosurgery, expected findings after surgery.  No focal neurological deficits, patient is currently at baseline.  Patient continues to use conservative therapy for close compression fracture body L1 vertebrae, stable on recent x-ray imaging.  CBC, CMP, stable.  Medications reviewed, patient is only missing amlodipine which has been sent to pharmacy.      Patient has appropriate case management, will also continue to work on finding patient new neurologist based on insurance.    Patient notes he has been spearing seeing erectile dysfunction, will discuss with neurology if safe in the setting of bilateral subdural  hematomas, s/p neurosurgical procedure.    My impressions and treatment recommendations were discussed in detail with the patient who verbalized understanding and had no further questions.  Discharge instructions were provided.    Subjective     Chief Complaint: Follow-up visit    History of Present Illness:    Patient is a pleasant 54-year-old male coming in for follow-up visit chronic disease management, recent fall, imaging and blood work reviewed.    The following portions of the patient's history were reviewed and updated as appropriate: allergies, current medications, past family history, past medical history, past social history, past surgical history and problem list.    REVIEW OF SYSTEMS:  A complete 12-point review of systems is negative other than that noted in the HPI.    Review of Systems   Constitutional:  Negative for chills, fatigue and fever.   HENT:  Negative for congestion and sore throat.    Eyes:  Negative for pain and visual disturbance.   Respiratory:  Negative for shortness of breath and wheezing.    Cardiovascular:  Negative for chest pain and palpitations.   Gastrointestinal:  Negative for abdominal pain, constipation, diarrhea, nausea and vomiting.   Genitourinary:  Negative for dysuria and frequency.   Musculoskeletal:  Negative for back pain and neck pain.   Skin:  Negative for color change and rash.   Neurological:  Negative for dizziness and headaches.   Psychiatric/Behavioral:  Negative for agitation and confusion.    All other systems reviewed and are negative.        Current Outpatient Medications:     acetaminophen (TYLENOL) 325 mg tablet, Take 650 mg by mouth every 6 (six) hours as needed, Disp: , Rfl:     amLODIPine (NORVASC) 5 mg tablet, Take 1 tablet (5 mg total) by mouth daily, Disp: 90 tablet, Rfl: 3    levETIRAcetam (Keppra) 750 mg tablet, Take 1 tablet (750 mg total) by mouth 2 (two) times a day, Disp: 90 tablet, Rfl: 0    methocarbamol (ROBAXIN) 750 mg tablet, Take 750 mg  by mouth 4 (four) times a day, Disp: , Rfl:     metoprolol succinate (Toprol XL) 25 mg 24 hr tablet, Take 1 tablet (25 mg total) by mouth daily, Disp: 90 tablet, Rfl: 0    lidocaine (LIDODERM) 5 %, Place 1 patch on the skin daily (Patient not taking: Reported on 11/19/2024), Disp: , Rfl:     oxyCODONE-acetaminophen (PERCOCET) 5-325 mg per tablet, Take 1 tablet by mouth every 4 (four) hours as needed (Patient not taking: Reported on 11/19/2024), Disp: , Rfl:   Past Medical History:   Diagnosis Date    Insomnia      Past Surgical History:   Procedure Laterality Date    BRAIN SURGERY      KNEE SURGERY      MANDIBLE FRACTURE SURGERY       Social History     Socioeconomic History    Marital status: Single     Spouse name: Not on file    Number of children: Not on file    Years of education: Not on file    Highest education level: Not on file   Occupational History    Not on file   Tobacco Use    Smoking status: Some Days     Types: Cigars     Passive exposure: Never    Smokeless tobacco: Never   Substance and Sexual Activity    Alcohol use: Not Currently    Drug use: Never    Sexual activity: Not on file   Other Topics Concern    Not on file   Social History Narrative    Not on file     Social Drivers of Health     Financial Resource Strain: High Risk (9/16/2024)    Received from [facility]    Overall Financial Resource Strain (CARDIA)     Difficulty of Paying Living Expenses: Very hard   Food Insecurity: Low Risk  (10/31/2024)    Received from [facility]    Food Insecurity     Within the past 12 months, the food you bought just didn’t last and you didn’t have money to get more.: Never true     Within the past 12 months, you worried that your food would run out before you got money to buy more.: Never true   Recent Concern: Food Insecurity - Food Insecurity Present (9/16/2024)    Received from [facility]    Hunger Vital Sign     Worried About Running Out of Food in the Last Year:  "Often true     Ran Out of Food in the Last Year: Often true   Transportation Needs: Unmet Transportation Needs (9/16/2024)    Received from [facility]    PRAPARE - Transportation     Lack of Transportation (Medical): Yes     Lack of Transportation (Non-Medical): Yes   Physical Activity: Inactive (9/16/2024)    Received from [facility]    Exercise Vital Sign     Days of Exercise per Week: 0 days     Minutes of Exercise per Session: 0 min   Stress: Stress Concern Present (9/16/2024)    Received from [facility]    [employer] - Occupational Stress Questionnaire     Feeling of Stress : Rather much   Social Connections: Unknown (9/16/2024)    Received from [facility]    Social Connection and Isolation Panel [NHANES]     Frequency of Communication with Friends and Family: Patient declined     Frequency of Social Gatherings with Friends and Family: Patient declined     Attends Yazdanism Services: Not on file     Active Member of Clubs or Organizations: Yes     Attends Club or Organization Meetings: 1 to 4 times per year     Marital Status: Never    Intimate Partner Violence: Not on file   Housing Stability: High Risk (9/16/2024)    Received from [facility]    Housing Stability Vital Sign     Unable to Pay for Housing in the Last Year: Yes     Number of Times Moved in the Last Year: 2     Homeless in the Last Year: Yes     Family History   Problem Relation Age of Onset    No Known Problems Mother     No Known Problems Father      No Known Allergies    Objective     Vitals:    11/19/24 1147   BP: 140/92   BP Location: Left arm   Patient Position: Sitting   Cuff Size: Standard   Pulse: 81   Resp: 18   Temp: 98 °F (36.7 °C)   TempSrc: Temporal   SpO2: 98%   Weight: 78.3 kg (172 lb 9.6 oz)   Height: 6' 1\" (1.854 m)       Physical Exam:     GENERAL: NAD, pleasant   HEENT:  NC/AT, PERRL, EOMI, no scleral " icterus  CARDIAC:  RRR, +S1/S2, no S3/S4 appreciated, no m/g/r  PULMONARY:  CTA B/L, no wheezing/rales/rhonci, non-labored breathing  ABDOMEN:  Soft, NT/ND, no rebound/guarding/rigidity  Extremities:. No edema, cyanosis, or clubbing  Musculoskeletal:  Full range of motion intact in all extremities   NEUROLOGIC: Grossly intact, no focal deficits  SKIN:  No rashes or erythema noted on exposed skin  Psych: Normal affect, mood stable    ==  PLEASE NOTE:  This encounter was completed utilizing the [email or web address] Direct Speech Voice Recognition Software. Grammatical errors, random word insertions, pronoun errors and incomplete sentences are occasional consequences of the system due to software limitations, ambient noise and hardware issues.These may be missed by proof reading prior to affixing electronic signature. Any questions or concerns about the content, text or information contained within the body of this dictation should be directly addressed to the physician for clarification. Please do not hesitate to call me directly if you have any any questions or concerns.    Ildefonso Calvillo, DO  [facility]   [facility]

## 2024-11-21 ENCOUNTER — TELEPHONE (OUTPATIENT)
Age: 54
End: 2024-11-21

## 2024-11-21 NOTE — TELEPHONE ENCOUNTER
Sergey called to leave a message for Dr. Calvillo. Blood Pressure medication was ordered, but Sergey said Dr. Calvillo was going to prescribe something else. He declined to tell me what it was and said it was private and that Dr. Calvillo would know.    Sergey is getting a new phone number. For now, use [phone] room [phone]. He is getting the new number today and will update information later.    Please call in prescription if appropriate and advise Sergey. Thank you.

## 2024-11-26 ENCOUNTER — TELEPHONE (OUTPATIENT)
Age: 54
End: 2024-11-26

## 2024-11-26 NOTE — TELEPHONE ENCOUNTER
Call from day care requesting a copy of the Med 1 form from 10/15/24 to be faxed to them as soon as possible      Fax to [phone]

## 2024-12-06 ENCOUNTER — TELEPHONE (OUTPATIENT)
Age: 54
End: 2024-12-06

## 2024-12-06 NOTE — TELEPHONE ENCOUNTER
Pt stated that he needs his chart notes in regards to brain surgeries he had to be faxed to:     Attn: Shruthi Dunbar fax# [phone]   case# [id number]    Thank for your kind assistance.

## 2025-01-17 ENCOUNTER — TELEMEDICINE (OUTPATIENT)
Dept: FAMILY MEDICINE CLINIC | Facility: CLINIC | Age: 55
End: 2025-01-17
Payer: MEDICAID

## 2025-01-17 DIAGNOSIS — F51.01 PRIMARY INSOMNIA: ICD-10-CM

## 2025-01-17 DIAGNOSIS — N52.9 ERECTILE DYSFUNCTION, UNSPECIFIED ERECTILE DYSFUNCTION TYPE: ICD-10-CM

## 2025-01-17 DIAGNOSIS — S06.5XAA BILATERAL SUBDURAL HEMATOMAS (HCC): Primary | ICD-10-CM

## 2025-01-17 DIAGNOSIS — F41.8 ANXIETY WITH DEPRESSION: ICD-10-CM

## 2025-01-17 DIAGNOSIS — I10 PRIMARY HYPERTENSION: ICD-10-CM

## 2025-01-17 DIAGNOSIS — S32.010A CLOSED COMPRESSION FRACTURE OF BODY OF L1 VERTEBRA (HCC): ICD-10-CM

## 2025-01-17 PROCEDURE — 99214 OFFICE O/P EST MOD 30 MIN: CPT | Performed by: STUDENT IN AN ORGANIZED HEALTH CARE EDUCATION/TRAINING PROGRAM

## 2025-01-17 RX ORDER — LEVETIRACETAM 750 MG/1
750 TABLET ORAL 2 TIMES DAILY
Qty: 180 TABLET | Refills: 0 | Status: SHIPPED | OUTPATIENT
Start: 2025-01-17 | End: 2025-01-24 | Stop reason: SDUPTHER

## 2025-01-17 RX ORDER — METOPROLOL SUCCINATE 25 MG/1
25 TABLET, EXTENDED RELEASE ORAL DAILY
Qty: 90 TABLET | Refills: 0 | Status: SHIPPED | OUTPATIENT
Start: 2025-01-17 | End: 2025-01-24 | Stop reason: SDUPTHER

## 2025-01-17 NOTE — ASSESSMENT & PLAN NOTE
Continue Keppra at this time, follow-up neurosurgery recommendations appreciated  Orders:  •  levETIRAcetam (Keppra) 750 mg tablet; Take 1 tablet (750 mg total) by mouth 2 (two) times a day  •  Ambulatory Referral to Neurosurgery; Future

## 2025-01-17 NOTE — PROGRESS NOTES
Virtual Regular Visit  Name: Sergey Downing      : 1970      MRN: [id number]  Encounter Provider: Ildefonso Calvillo DO  Encounter Date: 2025   Encounter department: [facility]      Verification of patient location:  Patient is located at Home in the following state in which I hold an active license [place] :  Assessment & Plan  Bilateral subdural hematomas (HCC)  Continue Keppra at this time, follow-up neurosurgery recommendations appreciated  Orders:  •  levETIRAcetam (Keppra) 750 mg tablet; Take 1 tablet (750 mg total) by mouth 2 (two) times a day  •  Ambulatory Referral to Neurosurgery; Future    Primary hypertension  Continue to monitor ambulatory blood pressure daily with log for next week's visit  Orders:  •  metoprolol succinate (Toprol XL) 25 mg 24 hr tablet; Take 1 tablet (25 mg total) by mouth daily    Closed compression fracture of body of L1 vertebra (HCC)  Stable on most recent x-ray, minimal pain       Anxiety with depression  Patient is following with psychiatry, appreciate recommendations       Primary insomnia  Continue conservative management, sleep-wake cycle discussed       Erectile dysfunction, unspecified erectile dysfunction type  Follow-up neurosurgery to determine if medication appropriate in the setting of bilateral subdural hematomas           Encounter provider Ildefonso Calvillo DO    The patient was identified by name and date of birth. Sergey Downing was informed that this is a telemedicine visit and that the visit is being conducted through the Epic Embedded platform. He agrees to proceed..  My office door was closed. No one else was in the room.  He acknowledged consent and understanding of privacy and security of the video platform. The patient has agreed to participate and understands they can discontinue the visit at any time.    Patient is aware this is a billable service.     History of Present Illness     Follow up visit.       Review of  Systems   Constitutional:  Negative for chills and fever.   HENT:  Negative for ear pain and sore throat.    Eyes:  Negative for pain and visual disturbance.   Respiratory:  Negative for cough and shortness of breath.    Cardiovascular:  Negative for chest pain and palpitations.   Gastrointestinal:  Negative for abdominal pain and vomiting.   Genitourinary:  Negative for dysuria and hematuria.   Musculoskeletal:  Negative for arthralgias and back pain.   Skin:  Negative for color change and rash.   Neurological:  Negative for seizures and syncope.   All other systems reviewed and are negative.      Objective   There were no vitals taken for this visit.    Physical Exam  Constitutional:       General: He is not in acute distress.  Eyes:      General: No scleral icterus.     Conjunctiva/sclera: Conjunctivae normal.   Pulmonary:      Effort: Pulmonary effort is normal. No respiratory distress.   Musculoskeletal:         General: No swelling or deformity.   Skin:     Coloration: Skin is not jaundiced.      Findings: No bruising.   Neurological:      General: No focal deficit present.      Mental Status: He is alert and oriented to person, place, and time. Mental status is at baseline.   Psychiatric:         Mood and Affect: Mood normal.         Behavior: Behavior normal.         Visit Time  Total Visit Duration: 14 minutes total time

## 2025-01-17 NOTE — ASSESSMENT & PLAN NOTE
Continue to monitor ambulatory blood pressure daily with log for next week's visit  Orders:  •  metoprolol succinate (Toprol XL) 25 mg 24 hr tablet; Take 1 tablet (25 mg total) by mouth daily

## 2025-01-24 ENCOUNTER — OFFICE VISIT (OUTPATIENT)
Dept: FAMILY MEDICINE CLINIC | Facility: CLINIC | Age: 55
End: 2025-01-24
Payer: MEDICAID

## 2025-01-24 VITALS
HEIGHT: 73 IN | TEMPERATURE: 98 F | OXYGEN SATURATION: 97 % | SYSTOLIC BLOOD PRESSURE: 130 MMHG | DIASTOLIC BLOOD PRESSURE: 80 MMHG | WEIGHT: 190 LBS | BODY MASS INDEX: 25.18 KG/M2 | HEART RATE: 95 BPM | RESPIRATION RATE: 18 BRPM

## 2025-01-24 DIAGNOSIS — F41.8 ANXIETY WITH DEPRESSION: ICD-10-CM

## 2025-01-24 DIAGNOSIS — S32.010A CLOSED COMPRESSION FRACTURE OF BODY OF L1 VERTEBRA (HCC): ICD-10-CM

## 2025-01-24 DIAGNOSIS — F51.01 PRIMARY INSOMNIA: ICD-10-CM

## 2025-01-24 DIAGNOSIS — M25.511 CHRONIC RIGHT SHOULDER PAIN: ICD-10-CM

## 2025-01-24 DIAGNOSIS — G89.29 CHRONIC RIGHT SHOULDER PAIN: ICD-10-CM

## 2025-01-24 DIAGNOSIS — S06.5XAA BILATERAL SUBDURAL HEMATOMAS (HCC): ICD-10-CM

## 2025-01-24 DIAGNOSIS — I10 PRIMARY HYPERTENSION: Primary | ICD-10-CM

## 2025-01-24 PROCEDURE — 99214 OFFICE O/P EST MOD 30 MIN: CPT | Performed by: STUDENT IN AN ORGANIZED HEALTH CARE EDUCATION/TRAINING PROGRAM

## 2025-01-24 RX ORDER — LEVETIRACETAM 750 MG/1
750 TABLET ORAL 2 TIMES DAILY
Qty: 180 TABLET | Refills: 1 | Status: SHIPPED | OUTPATIENT
Start: 2025-01-24

## 2025-01-24 RX ORDER — METOPROLOL SUCCINATE 25 MG/1
25 TABLET, EXTENDED RELEASE ORAL DAILY
Qty: 90 TABLET | Refills: 1 | Status: SHIPPED | OUTPATIENT
Start: 2025-01-24

## 2025-01-24 NOTE — ASSESSMENT & PLAN NOTE
Continue follow-up with neurosurgery, neurology, continue Keppra until specialty evaluation.  No recent seizure activity.  No focal neurological deficits on exam  Orders:  •  levETIRAcetam (Keppra) 750 mg tablet; Take 1 tablet (750 mg total) by mouth 2 (two) times a day

## 2025-01-24 NOTE — ASSESSMENT & PLAN NOTE
Continue Toprol extended release 25 mg daily, blood pressure well-controlled in office today, continue to monitor ambulatory setting  Orders:  •  metoprolol succinate (Toprol XL) 25 mg 24 hr tablet; Take 1 tablet (25 mg total) by mouth daily

## 2025-01-24 NOTE — PROGRESS NOTES
Name: Sergey Downing      : 1970      MRN: [id number]  Encounter Provider: Ildefonso Calvillo DO  Encounter Date: 2025   Encounter department: [facility]  :  Assessment & Plan  Primary hypertension  Continue Toprol extended release 25 mg daily, blood pressure well-controlled in office today, continue to monitor ambulatory setting  Orders:  •  metoprolol succinate (Toprol XL) 25 mg 24 hr tablet; Take 1 tablet (25 mg total) by mouth daily    Bilateral subdural hematomas (HCC)  Continue follow-up with neurosurgery, neurology, continue Keppra until specialty evaluation.  No recent seizure activity.  No focal neurological deficits on exam  Orders:  •  levETIRAcetam (Keppra) 750 mg tablet; Take 1 tablet (750 mg total) by mouth 2 (two) times a day    Closed compression fracture of body of L1 vertebra (HCC)  Asymptomatic, monitor at this time       Primary insomnia  Patient notes sleep-wake cycle improvement       Anxiety with depression  Patient is following with appropriate case management       Chronic right shoulder pain  Continue stretching/strengthening, Voltaren gel as needed              History of Present Illness   Follow up chronic disease       Review of Systems   Constitutional:  Negative for chills, fatigue and fever.   HENT:  Negative for congestion and sore throat.    Eyes:  Negative for pain and visual disturbance.   Respiratory:  Negative for shortness of breath and wheezing.    Cardiovascular:  Negative for chest pain and palpitations.   Gastrointestinal:  Negative for abdominal pain, constipation, diarrhea, nausea and vomiting.   Genitourinary:  Negative for dysuria and frequency.   Musculoskeletal:  Positive for arthralgias. Negative for back pain and neck pain.   Skin:  Negative for color change and rash.   Neurological:  Negative for dizziness and headaches.   Psychiatric/Behavioral:  Negative for agitation and confusion.    All other systems reviewed and are  "negative.      Objective   /80 (BP Location: Left arm, Patient Position: Sitting, Cuff Size: Large)   Pulse 95   Temp 98 °F (36.7 °C) (Temporal)   Resp 18   Ht 6' 1\" (1.854 m)   Wt 86.2 kg (190 lb)   SpO2 97%   BMI 25.07 kg/m²      Physical Exam  Vitals and nursing note reviewed.   Constitutional:       General: He is not in acute distress.     Appearance: He is well-developed.   HENT:      Head: Normocephalic and atraumatic.   Eyes:      General: No scleral icterus.     Conjunctiva/sclera: Conjunctivae normal.   Cardiovascular:      Rate and Rhythm: Normal rate and regular rhythm.      Pulses: Normal pulses.      Heart sounds: No murmur heard.  Pulmonary:      Effort: Pulmonary effort is normal. No respiratory distress.      Breath sounds: Normal breath sounds.   Abdominal:      General: Bowel sounds are normal. There is no distension.      Palpations: Abdomen is soft.      Tenderness: There is no abdominal tenderness.   Musculoskeletal:         General: No swelling. Normal range of motion.      Cervical back: Neck supple.   Skin:     General: Skin is warm and dry.      Capillary Refill: Capillary refill takes less than 2 seconds.      Coloration: Skin is not jaundiced.   Neurological:      General: No focal deficit present.      Mental Status: He is alert and oriented to person, place, and time. Mental status is at baseline.   Psychiatric:         Mood and Affect: Mood normal.         Behavior: Behavior normal.         "

## 2025-02-25 ENCOUNTER — OFFICE VISIT (OUTPATIENT)
Dept: FAMILY MEDICINE CLINIC | Facility: CLINIC | Age: 55
End: 2025-02-25
Payer: MEDICAID

## 2025-02-25 VITALS
WEIGHT: 191.2 LBS | DIASTOLIC BLOOD PRESSURE: 88 MMHG | TEMPERATURE: 97.9 F | BODY MASS INDEX: 25.34 KG/M2 | SYSTOLIC BLOOD PRESSURE: 134 MMHG | HEART RATE: 87 BPM | HEIGHT: 73 IN | OXYGEN SATURATION: 98 % | RESPIRATION RATE: 16 BRPM

## 2025-02-25 DIAGNOSIS — M25.511 CHRONIC RIGHT SHOULDER PAIN: ICD-10-CM

## 2025-02-25 DIAGNOSIS — Z12.11 ENCOUNTER FOR COLORECTAL CANCER SCREENING: ICD-10-CM

## 2025-02-25 DIAGNOSIS — G89.29 CHRONIC RIGHT SHOULDER PAIN: ICD-10-CM

## 2025-02-25 DIAGNOSIS — N52.9 ERECTILE DYSFUNCTION, UNSPECIFIED ERECTILE DYSFUNCTION TYPE: ICD-10-CM

## 2025-02-25 DIAGNOSIS — R56.9 SEIZURE (HCC): ICD-10-CM

## 2025-02-25 DIAGNOSIS — S32.010A CLOSED COMPRESSION FRACTURE OF BODY OF L1 VERTEBRA (HCC): ICD-10-CM

## 2025-02-25 DIAGNOSIS — I10 PRIMARY HYPERTENSION: Primary | ICD-10-CM

## 2025-02-25 DIAGNOSIS — F41.8 ANXIETY WITH DEPRESSION: ICD-10-CM

## 2025-02-25 DIAGNOSIS — Z12.12 ENCOUNTER FOR COLORECTAL CANCER SCREENING: ICD-10-CM

## 2025-02-25 DIAGNOSIS — S06.5XAA BILATERAL SUBDURAL HEMATOMAS (HCC): ICD-10-CM

## 2025-02-25 DIAGNOSIS — F51.01 PRIMARY INSOMNIA: ICD-10-CM

## 2025-02-25 PROCEDURE — 99214 OFFICE O/P EST MOD 30 MIN: CPT | Performed by: STUDENT IN AN ORGANIZED HEALTH CARE EDUCATION/TRAINING PROGRAM

## 2025-02-25 RX ORDER — METOPROLOL SUCCINATE 25 MG/1
25 TABLET, EXTENDED RELEASE ORAL DAILY
Qty: 90 TABLET | Refills: 1 | Status: SHIPPED | OUTPATIENT
Start: 2025-02-25

## 2025-02-25 NOTE — ASSESSMENT & PLAN NOTE
Continue Keppra 250 mg twice daily, follow-up with neurology on appropriate recommendations in the setting of bilateral subdural hematomas  Orders:  •  Ambulatory Referral to Neurology; Future

## 2025-02-25 NOTE — PROGRESS NOTES
Name: Sergey Downing      : 1970      MRN: [id number]  Encounter Provider: Ildefonso Calvillo DO  Encounter Date: 2025   Encounter department: [facility]  :  Assessment & Plan  Primary hypertension  Blood pressure well-controlled, continue Toprol extended release 25 mg daily in the morning  Orders:  •  metoprolol succinate (Toprol XL) 25 mg 24 hr tablet; Take 1 tablet (25 mg total) by mouth daily    Bilateral subdural hematomas (HCC)  Recommend follow-up CT head imaging for stability, referral for neurology given to patient for follow-up with closer out of hospital network that takes insurance.  Orders:  •  CT head wo contrast; Future  •  Ambulatory Referral to Neurology; Future    Anxiety with depression  Patient is seeing psychology, stable, doing well       Seizure (HCC)  Continue Keppra 250 mg twice daily, follow-up with neurology on appropriate recommendations in the setting of bilateral subdural hematomas  Orders:  •  Ambulatory Referral to Neurology; Future    Encounter for colorectal cancer screening    Orders:  •  Ambulatory Referral to Gastroenterology; Future    Primary insomnia  Sleeping has improved, continue to monitor       Closed compression fracture of body of L1 vertebra (HCC)  HCC documentation,       Chronic right shoulder pain         Erectile dysfunction, unspecified erectile dysfunction type  Follow-up with neurology to determine if sildenafil appropriate             Depression Screening and Follow-up Plan: Patient was screened for depression during today's encounter. They screened negative with a PHQ-9 score of 1.        History of Present Illness   Follow up visit.       Review of Systems   Constitutional:  Negative for chills and fever.   HENT:  Negative for ear pain and sore throat.    Eyes:  Negative for pain and visual disturbance.   Respiratory:  Negative for cough and shortness of breath.    Cardiovascular:  Negative for chest pain and  "palpitations.   Gastrointestinal:  Negative for abdominal pain, constipation, diarrhea, nausea and vomiting.   Genitourinary:  Negative for dysuria and hematuria.   Musculoskeletal:  Negative for arthralgias and back pain.   Skin:  Negative for color change and rash.   Neurological:  Negative for dizziness, seizures, syncope and headaches.   Psychiatric/Behavioral:  Negative for agitation, behavioral problems and confusion.    All other systems reviewed and are negative.      Objective   /88 (BP Location: Left arm, Patient Position: Sitting, Cuff Size: Large)   Pulse 87   Temp 97.9 °F (36.6 °C) (Temporal)   Resp 16   Ht 6' 1\" (1.854 m)   Wt 86.7 kg (191 lb 3.2 oz)   SpO2 98%   BMI 25.23 kg/m²      Physical Exam  Vitals and nursing note reviewed.   Constitutional:       General: He is not in acute distress.     Appearance: He is well-developed.   HENT:      Head: Normocephalic and atraumatic.   Eyes:      General: No scleral icterus.     Conjunctiva/sclera: Conjunctivae normal.   Cardiovascular:      Rate and Rhythm: Normal rate and regular rhythm.      Pulses: Normal pulses.      Heart sounds: No murmur heard.  Pulmonary:      Effort: Pulmonary effort is normal. No respiratory distress.      Breath sounds: Normal breath sounds.   Abdominal:      General: Bowel sounds are normal. There is no distension.      Palpations: Abdomen is soft.      Tenderness: There is no abdominal tenderness.   Musculoskeletal:         General: No swelling. Normal range of motion.      Cervical back: Neck supple.   Skin:     General: Skin is warm and dry.      Capillary Refill: Capillary refill takes less than 2 seconds.      Coloration: Skin is not jaundiced.   Neurological:      General: No focal deficit present.      Mental Status: He is alert and oriented to person, place, and time. Mental status is at baseline.   Psychiatric:         Mood and Affect: Mood normal.         Behavior: Behavior normal.         "

## 2025-02-25 NOTE — ASSESSMENT & PLAN NOTE
Recommend follow-up CT head imaging for stability, referral for neurology given to patient for follow-up with closer out of hospital network that takes insurance.  Orders:  •  CT head wo contrast; Future  •  Ambulatory Referral to Neurology; Future

## 2025-02-25 NOTE — ASSESSMENT & PLAN NOTE
Blood pressure well-controlled, continue Toprol extended release 25 mg daily in the morning  Orders:  •  metoprolol succinate (Toprol XL) 25 mg 24 hr tablet; Take 1 tablet (25 mg total) by mouth daily

## 2025-02-26 ENCOUNTER — TELEPHONE (OUTPATIENT)
Age: 55
End: 2025-02-26

## 2025-02-26 NOTE — TELEPHONE ENCOUNTER
Called and left detailed message advising Dr Calvillo not in office today and for them to refax form.    Was form given to you at patient's visit yesterday?

## 2025-02-26 NOTE — TELEPHONE ENCOUNTER
[facility] called wanting to kno if the office had faxed over the patient's medical clearance form that was supposed to be filled out at his office visit yesterday. If complete, please fax to them at fax number . If form was never received by them, please call .

## 2025-03-05 ENCOUNTER — TELEPHONE (OUTPATIENT)
Age: 55
End: 2025-03-05

## 2025-03-05 NOTE — TELEPHONE ENCOUNTER
Belia called in stating she received his Med 1 form but it was stamped and not signed. She says it must be signed. She's requesting the signed documents be faxed to her at [phone].

## 2025-03-05 NOTE — TELEPHONE ENCOUNTER
Called Belia- she advised that Med One form completed and scanned into chart in October was not signed and dated by Dr Calvillo. Placed form in Dr Calvillo's folder and advised that he is not in office today. She is requesting call back when it is faxed.

## 2025-03-20 ENCOUNTER — TELEPHONE (OUTPATIENT)
Age: 55
End: 2025-03-20

## 2025-03-20 NOTE — TELEPHONE ENCOUNTER
Patient had CT scan completed at [facility] and Braden (from [facility]) said he faxed a copy of the report and wanted to know if it had been received yet as there were some issues he was concerned about on the CT scan.  Please check and call Braden back for further information.  #[phone]. Tried warm transfer, N/A.

## 2025-03-24 ENCOUNTER — RESULTS FOLLOW-UP (OUTPATIENT)
Dept: FAMILY MEDICINE CLINIC | Facility: CLINIC | Age: 55
End: 2025-03-24

## 2025-03-24 NOTE — TELEPHONE ENCOUNTER
Called and spoke with patient and nurse Darcy at [facility]. Patient has not yet scheduled with neurosurgeon. They will follow up for appointment so that doctor can review ct findings.

## 2025-03-31 NOTE — TELEPHONE ENCOUNTER
Patient is going to his neurologist appointment on Tuesday , he will have them forward the consult to you . Do you still want him to have a virtual on Tuesday  since he is going to neuro?

## 2025-03-31 NOTE — TELEPHONE ENCOUNTER
Lab Result:    Date/Time Drawn:    Ordering Provider:    Lab Personnel's Name:        Read back to the lab as documented above     []     Secure Chat message sent to on-call provider  []     Provider confirmed receipt of message     []

## 2025-03-31 NOTE — TELEPHONE ENCOUNTER
----- Message from Karine AGUERO sent at 3/31/2025 10:22 AM EDT -----  Please schedule  ----- Message -----  From: Ildefonso Calvillo DO  Sent: 3/30/2025   5:53 PM EDT  To: [facility]    Please see if we can schedule patient on Tuesday for virtual visit.

## 2025-03-31 NOTE — TELEPHONE ENCOUNTER
Patient returning a missed call from the office. He states he already scheduled a consult appointment with Neurology at [facility]

## 2025-04-03 ENCOUNTER — TELEMEDICINE (OUTPATIENT)
Dept: FAMILY MEDICINE CLINIC | Facility: CLINIC | Age: 55
End: 2025-04-03
Payer: MEDICAID

## 2025-04-03 DIAGNOSIS — R19.4 CHANGE IN BOWEL HABIT: ICD-10-CM

## 2025-04-03 DIAGNOSIS — F51.01 PRIMARY INSOMNIA: ICD-10-CM

## 2025-04-03 DIAGNOSIS — I10 PRIMARY HYPERTENSION: ICD-10-CM

## 2025-04-03 DIAGNOSIS — S06.5XAA BILATERAL SUBDURAL HEMATOMAS (HCC): Primary | ICD-10-CM

## 2025-04-03 DIAGNOSIS — F41.8 ANXIETY WITH DEPRESSION: ICD-10-CM

## 2025-04-03 DIAGNOSIS — R56.9 SEIZURE (HCC): ICD-10-CM

## 2025-04-03 PROCEDURE — 99213 OFFICE O/P EST LOW 20 MIN: CPT | Performed by: STUDENT IN AN ORGANIZED HEALTH CARE EDUCATION/TRAINING PROGRAM

## 2025-04-03 NOTE — PROGRESS NOTES
Virtual Brief Visit  Name: Sergey Downing      : 1970      MRN: [id number]  Encounter Provider: Ildefonso Calvillo DO  Encounter Date: 4/3/2025   Encounter department: [facility]  :  Assessment & Plan  Bilateral subdural hematomas (HCC)  Patient has recently seen neurology 2025, appreciate recommendations, per patient no focal neurological deficits, stable.  Most recent CT head reviewed with patient, likely residual from previous bilateral subdural hematomas, discussed with neurology to determine if MRI appropriate.       Primary hypertension  Continue to monitor blood pressure ambulatory setting, continue Toprol-XL       Anxiety with depression  Stable, patient is following with appropriate team members       Primary insomnia         Seizure (HCC)  Continue Keppra at this time, EEG follow-up recommended with neurology       Change in bowel habit  Patient has appropriate follow-up with gastroenterology to determine appropriate endoscopic evaluation.           History of Present Illness   Follow-up chronic disease management.    Administrative Statements   Encounter provider Ildefonso Calvillo DO    The Patient is located at Home and in the following state in which I hold an active license [place].    The patient was identified by name and date of birth. Sergey Downing was informed that this is a telemedicine visit and that the visit is being conducted through Telephone.  My office door was closed. No one else was in the room.  He acknowledged consent and understanding of privacy and security of the video platform. The patient has agreed to participate and understands they can discontinue the visit at any time.    I have spent a total time of 16 minutes in caring for this patient on the day of the visit/encounter including Diagnostic results, Prognosis, Risks and benefits of tx options, Impressions, and Counseling / Coordination of care, not including the time spent for establishing  the audio/video connection.

## 2025-04-03 NOTE — ASSESSMENT & PLAN NOTE
Patient has recently seen neurology 4/2/2025, appreciate recommendations, per patient no focal neurological deficits, stable.  Most recent CT head reviewed with patient, likely residual from previous bilateral subdural hematomas, discussed with neurology to determine if MRI appropriate.

## 2025-04-03 NOTE — ASSESSMENT & PLAN NOTE
Patient has appropriate follow-up with gastroenterology to determine appropriate endoscopic evaluation.

## 2025-04-29 ENCOUNTER — OFFICE VISIT (OUTPATIENT)
Dept: FAMILY MEDICINE CLINIC | Facility: CLINIC | Age: 55
End: 2025-04-29
Payer: MEDICAID

## 2025-04-29 VITALS
BODY MASS INDEX: 23.91 KG/M2 | RESPIRATION RATE: 16 BRPM | SYSTOLIC BLOOD PRESSURE: 130 MMHG | DIASTOLIC BLOOD PRESSURE: 86 MMHG | WEIGHT: 180.4 LBS | HEIGHT: 73 IN | TEMPERATURE: 97.2 F | HEART RATE: 98 BPM

## 2025-04-29 DIAGNOSIS — F51.01 PRIMARY INSOMNIA: ICD-10-CM

## 2025-04-29 DIAGNOSIS — K08.9 CHRONIC DENTAL PAIN: ICD-10-CM

## 2025-04-29 DIAGNOSIS — Z13.220 SCREENING CHOLESTEROL LEVEL: ICD-10-CM

## 2025-04-29 DIAGNOSIS — S06.5XAA BILATERAL SUBDURAL HEMATOMAS (HCC): Primary | ICD-10-CM

## 2025-04-29 DIAGNOSIS — Z12.5 PROSTATE CANCER SCREENING: ICD-10-CM

## 2025-04-29 DIAGNOSIS — E87.8 ELECTROLYTE ABNORMALITY: ICD-10-CM

## 2025-04-29 DIAGNOSIS — Z13.1 DIABETES MELLITUS SCREENING: ICD-10-CM

## 2025-04-29 DIAGNOSIS — R19.4 CHANGE IN BOWEL HABIT: ICD-10-CM

## 2025-04-29 DIAGNOSIS — E53.8 B12 DEFICIENCY: ICD-10-CM

## 2025-04-29 DIAGNOSIS — Z13.29 THYROID DISORDER SCREENING: ICD-10-CM

## 2025-04-29 DIAGNOSIS — Z13.0 SCREENING, IRON DEFICIENCY ANEMIA: ICD-10-CM

## 2025-04-29 DIAGNOSIS — F41.8 ANXIETY WITH DEPRESSION: ICD-10-CM

## 2025-04-29 DIAGNOSIS — E55.9 VITAMIN D DEFICIENCY: ICD-10-CM

## 2025-04-29 DIAGNOSIS — R56.9 SEIZURE (HCC): ICD-10-CM

## 2025-04-29 DIAGNOSIS — S32.010A CLOSED COMPRESSION FRACTURE OF BODY OF L1 VERTEBRA (HCC): ICD-10-CM

## 2025-04-29 DIAGNOSIS — I10 PRIMARY HYPERTENSION: ICD-10-CM

## 2025-04-29 DIAGNOSIS — G89.29 CHRONIC DENTAL PAIN: ICD-10-CM

## 2025-04-29 PROCEDURE — 99214 OFFICE O/P EST MOD 30 MIN: CPT | Performed by: STUDENT IN AN ORGANIZED HEALTH CARE EDUCATION/TRAINING PROGRAM

## 2025-04-29 NOTE — PROGRESS NOTES
Name: Sergey Downing      : 1970      MRN: [id number]  Encounter Provider: Ildefonso Calvillo DO  Encounter Date: 2025   Encounter department: [facility]  :  Assessment & Plan  Bilateral subdural hematomas (HCC)  Overall no deficits, continue seizure prophylaxis with Keppra, following with neurology/neurosurgery, EEG recently unremarkable, CT imaging results pending at outside facility       Chronic dental pain  Patient is medically optimized for planned dental procedure       Primary hypertension  Blood pressure well-controlled, continue Toprol-XL       Closed compression fracture of body of L1 vertebra (HCC)  Asymptomatic, no pain on exam       Anxiety with depression  Stable, monitor       Primary insomnia         Seizure (HCC)  Continue Keppra       Change in bowel habit  Follow-up with gastroenterology       Electrolyte abnormality  At next visit, full set of blood work recommended  Orders:  •  Comprehensive metabolic panel; Future    Screening, iron deficiency anemia    Orders:  •  CBC and differential; Future    B12 deficiency    Orders:  •  Vitamin B12; Future    Vitamin D deficiency    Orders:  •  Vitamin D 25 hydroxy; Future    Thyroid disorder screening    Orders:  •  T4, free; Future  •  TSH, 3rd generation; Future    Prostate cancer screening    Orders:  •  PSA, total and free; Future    Diabetes mellitus screening    Orders:  •  Hemoglobin A1C; Future    Screening cholesterol level    Orders:  •  Lipid panel; Future           History of Present Illness   Follow-up chronic disease management.      Review of Systems   Constitutional:  Negative for chills and fever.   HENT:  Negative for ear pain and sore throat.    Eyes:  Negative for pain and visual disturbance.   Respiratory:  Negative for cough and shortness of breath.    Cardiovascular:  Negative for chest pain and palpitations.   Gastrointestinal:  Negative for abdominal pain, constipation, diarrhea, nausea and  "vomiting.   Genitourinary:  Negative for dysuria and hematuria.   Musculoskeletal:  Negative for arthralgias and back pain.   Skin:  Negative for color change and rash.   Neurological:  Negative for seizures and syncope.   Psychiatric/Behavioral:  Negative for agitation, behavioral problems and confusion.    All other systems reviewed and are negative.      Objective   /86   Pulse 98   Temp (!) 97.2 °F (36.2 °C)   Resp 16   Ht 6' 1\" (1.854 m)   Wt 81.8 kg (180 lb 6.4 oz)   BMI 23.80 kg/m²      Physical Exam  Vitals and nursing note reviewed.   Constitutional:       General: He is not in acute distress.     Appearance: He is well-developed.   HENT:      Head: Normocephalic and atraumatic.   Eyes:      General: No scleral icterus.     Conjunctiva/sclera: Conjunctivae normal.   Cardiovascular:      Rate and Rhythm: Normal rate and regular rhythm.      Pulses: Normal pulses.      Heart sounds: No murmur heard.  Pulmonary:      Effort: Pulmonary effort is normal. No respiratory distress.      Breath sounds: Normal breath sounds.   Abdominal:      General: Bowel sounds are normal. There is no distension.      Palpations: Abdomen is soft.      Tenderness: There is no abdominal tenderness.   Musculoskeletal:         General: No swelling. Normal range of motion.      Cervical back: Neck supple.   Skin:     General: Skin is warm and dry.      Capillary Refill: Capillary refill takes less than 2 seconds.   Neurological:      General: No focal deficit present.      Mental Status: He is alert and oriented to person, place, and time. Mental status is at baseline.   Psychiatric:         Mood and Affect: Mood normal.         Behavior: Behavior normal.         "

## 2025-04-29 NOTE — ASSESSMENT & PLAN NOTE
Overall no deficits, continue seizure prophylaxis with Keppra, following with neurology/neurosurgery, EEG recently unremarkable, CT imaging results pending at outside facility

## 2025-05-08 LAB
25(OH)D3+25(OH)D2 SERPL-MCNC: 5.9 NG/ML (ref 30–100)
ALBUMIN SERPL-MCNC: 4.5 G/DL (ref 3.8–4.9)
ALP SERPL-CCNC: 64 IU/L (ref 44–121)
ALT SERPL-CCNC: 28 IU/L (ref 0–44)
AST SERPL-CCNC: 62 IU/L (ref 0–40)
BASOPHILS # BLD AUTO: 0 X10E3/UL (ref 0–0.2)
BASOPHILS NFR BLD AUTO: 1 %
BILIRUB SERPL-MCNC: 0.3 MG/DL (ref 0–1.2)
BUN SERPL-MCNC: 5 MG/DL (ref 6–24)
BUN/CREAT SERPL: 6 (ref 9–20)
CALCIUM SERPL-MCNC: 9 MG/DL (ref 8.7–10.2)
CHLORIDE SERPL-SCNC: 102 MMOL/L (ref 96–106)
CHOLEST SERPL-MCNC: 263 MG/DL (ref 100–199)
CO2 SERPL-SCNC: 18 MMOL/L (ref 20–29)
CREAT SERPL-MCNC: 0.84 MG/DL (ref 0.76–1.27)
EGFR: 104 ML/MIN/1.73
EOSINOPHIL # BLD AUTO: 0.1 X10E3/UL (ref 0–0.4)
EOSINOPHIL NFR BLD AUTO: 4 %
ERYTHROCYTE [DISTWIDTH] IN BLOOD BY AUTOMATED COUNT: 16.6 % (ref 11.6–15.4)
GLOBULIN SER-MCNC: 3.4 G/DL (ref 1.5–4.5)
GLUCOSE SERPL-MCNC: 102 MG/DL (ref 70–99)
HBA1C MFR BLD: 5.5 % (ref 4.8–5.6)
HCT VFR BLD AUTO: 44 % (ref 37.5–51)
HDLC SERPL-MCNC: 133 MG/DL
HGB BLD-MCNC: 14.3 G/DL (ref 13–17.7)
IMM GRANULOCYTES # BLD: 0 X10E3/UL (ref 0–0.1)
IMM GRANULOCYTES NFR BLD: 0 %
LDLC SERPL CALC-MCNC: 119 MG/DL (ref 0–99)
LYMPHOCYTES # BLD AUTO: 0.9 X10E3/UL (ref 0.7–3.1)
LYMPHOCYTES NFR BLD AUTO: 29 %
MCH RBC QN AUTO: 27.4 PG (ref 26.6–33)
MCHC RBC AUTO-ENTMCNC: 32.5 G/DL (ref 31.5–35.7)
MCV RBC AUTO: 85 FL (ref 79–97)
MICRODELETION SYND BLD/T FISH: NORMAL
MONOCYTES # BLD AUTO: 0.2 X10E3/UL (ref 0.1–0.9)
MONOCYTES NFR BLD AUTO: 6 %
NEUTROPHILS # BLD AUTO: 1.9 X10E3/UL (ref 1.4–7)
NEUTROPHILS NFR BLD AUTO: 60 %
PLATELET # BLD AUTO: 195 X10E3/UL (ref 150–450)
POTASSIUM SERPL-SCNC: 4 MMOL/L (ref 3.5–5.2)
PROT SERPL-MCNC: 7.9 G/DL (ref 6–8.5)
PSA SERPL-MCNC: 7.6 NG/ML (ref 0–4)
RBC # BLD AUTO: 5.21 X10E6/UL (ref 4.14–5.8)
SL AMB VLDL CHOLESTEROL CALC: 11 MG/DL (ref 5–40)
SODIUM SERPL-SCNC: 144 MMOL/L (ref 134–144)
T4 FREE SERPL-MCNC: 1.17 NG/DL (ref 0.82–1.77)
TRIGL SERPL-MCNC: 68 MG/DL (ref 0–149)
TSH SERPL DL<=0.005 MIU/L-ACNC: 1.35 UIU/ML (ref 0.45–4.5)
VIT B12 SERPL-MCNC: 435 PG/ML (ref 232–1245)
WBC # BLD AUTO: 3.2 X10E3/UL (ref 3.4–10.8)

## 2025-05-10 ENCOUNTER — RESULTS FOLLOW-UP (OUTPATIENT)
Dept: OTHER | Facility: HOSPITAL | Age: 55
End: 2025-05-10

## 2025-05-13 ENCOUNTER — TELEMEDICINE (OUTPATIENT)
Dept: FAMILY MEDICINE CLINIC | Facility: CLINIC | Age: 55
End: 2025-05-13
Payer: MEDICAID

## 2025-05-13 DIAGNOSIS — S06.5XAA BILATERAL SUBDURAL HEMATOMAS (HCC): ICD-10-CM

## 2025-05-13 DIAGNOSIS — R56.9 SEIZURE (HCC): ICD-10-CM

## 2025-05-13 DIAGNOSIS — F41.8 ANXIETY WITH DEPRESSION: ICD-10-CM

## 2025-05-13 DIAGNOSIS — E55.9 VITAMIN D DEFICIENCY: ICD-10-CM

## 2025-05-13 DIAGNOSIS — S32.010A CLOSED COMPRESSION FRACTURE OF BODY OF L1 VERTEBRA (HCC): ICD-10-CM

## 2025-05-13 DIAGNOSIS — F51.01 PRIMARY INSOMNIA: ICD-10-CM

## 2025-05-13 DIAGNOSIS — N40.0 BENIGN PROSTATIC HYPERPLASIA WITHOUT LOWER URINARY TRACT SYMPTOMS: ICD-10-CM

## 2025-05-13 DIAGNOSIS — D72.819 LEUKOPENIA, UNSPECIFIED TYPE: ICD-10-CM

## 2025-05-13 DIAGNOSIS — E78.5 BORDERLINE HYPERLIPIDEMIA: ICD-10-CM

## 2025-05-13 DIAGNOSIS — I10 PRIMARY HYPERTENSION: Primary | ICD-10-CM

## 2025-05-13 DIAGNOSIS — R97.20 ELEVATED PSA MEASUREMENT: ICD-10-CM

## 2025-05-13 PROCEDURE — 99213 OFFICE O/P EST LOW 20 MIN: CPT | Performed by: STUDENT IN AN ORGANIZED HEALTH CARE EDUCATION/TRAINING PROGRAM

## 2025-05-13 RX ORDER — ERGOCALCIFEROL 1.25 MG/1
50000 CAPSULE, LIQUID FILLED ORAL WEEKLY
Qty: 12 CAPSULE | Refills: 0 | Status: SHIPPED | OUTPATIENT
Start: 2025-05-13

## 2025-05-13 NOTE — PROGRESS NOTES
Virtual Brief Visit  Name: Sergey Downing      : 1970      MRN: [id number]  Encounter Provider: Ildefonso Calvillo DO  Encounter Date: 2025   Encounter department: [facility]  :  Assessment & Plan  Primary hypertension  Blood pressure has been stable, continue to monitor, continue Toprol XL       Closed compression fracture of body of L1 vertebra (HCC)  Asymptomatic, vitamin D/calcium supplementation       Vitamin D deficiency  Recommend vitamin D2 50,000 units once weekly followed by daily supplementation, recheck at next visit  Orders:  •  ergocalciferol (VITAMIN D2) 50,000 units; Take 1 capsule (50,000 Units total) by mouth once a week    Benign prostatic hyperplasia without lower urinary tract symptoms    Orders:  •  Ambulatory Referral to Urology; Future    Elevated PSA measurement  Given elevation in PSA levels, recommend urology evaluation  Orders:  •  Ambulatory Referral to Urology; Future    Bilateral subdural hematomas (HCC)  Follows with neurology, appreciate recommendations       Anxiety with depression  Stable, monitor       Primary insomnia  Stable, monitor       Seizure (HCC)  Continue Keppra with neurology support       Leukopenia, unspecified type  Repeat at next visit, asymptomatic       Borderline hyperlipidemia  Continue lifestyle modifications including low added sugar diet, low processed foods, increased activity as tolerated, repeat with routine blood work           History of Present Illness   Follow-up abnormal blood work    Administrative Statements   Encounter provider Ildefonso Calvillo DO    The Patient is located at Home and in the following state in which I hold an active license [place].    The patient was identified by name and date of birth. Sergey Downing was informed that this is a telemedicine visit and that the visit is being conducted through Telephone.  My office door was closed. No one else was in the room.  He acknowledged consent and  understanding of privacy and security of the video platform. The patient has agreed to participate and understands they can discontinue the visit at any time.    I have spent a total time of 14 minutes in caring for this patient on the day of the visit/encounter including Diagnostic results, Prognosis, Risk factor reductions, Impressions, Counseling / Coordination of care, and Documenting in the medical record, not including the time spent for establishing the audio/video connection.

## 2025-05-13 NOTE — TELEPHONE ENCOUNTER
----- Message from Ildefonso Calvillo DO sent at 5/10/2025 10:49 AM EDT -----  Please set patient up for Monday virtual telephone visit to discuss blood work.

## 2025-05-13 NOTE — ASSESSMENT & PLAN NOTE
Continue lifestyle modifications including low added sugar diet, low processed foods, increased activity as tolerated, repeat with routine blood work

## 2025-05-15 ENCOUNTER — TELEPHONE (OUTPATIENT)
Age: 55
End: 2025-05-15

## 2025-05-15 NOTE — TELEPHONE ENCOUNTER
Patient called stating that he was returning our call. I told him we called because he had an elevated PSA per referral on file. He said he knew nothing about having an elevated PSA. I asked him to call his PCP and ask them about it and call us back if he still needs the appointment.

## 2025-06-17 ENCOUNTER — TELEPHONE (OUTPATIENT)
Age: 55
End: 2025-06-17

## 2025-06-17 NOTE — TELEPHONE ENCOUNTER
Taylor from [facility] called to request office fax number. She will fax to the office a [employer] Form.     Please advise.

## 2025-06-17 NOTE — TELEPHONE ENCOUNTER
Tried calling Taylor. # given was for patient who says Taylor is his . He did not have her # right now, but says he will have her call us back for our fax# which is . He was not able to write down fax# as he was in a store.

## 2025-06-18 NOTE — TELEPHONE ENCOUNTER
Taylor called back stated received a letter back once faxing over documents and will try to refax them today. Please advise. Thank you

## 2025-07-22 ENCOUNTER — TELEPHONE (OUTPATIENT)
Dept: FAMILY MEDICINE CLINIC | Facility: CLINIC | Age: 55
End: 2025-07-22

## 2025-07-22 NOTE — TELEPHONE ENCOUNTER
[facility] at [facility] called to advise patient is scheduled tomorrow 7/23 at 10:30 for appointment with ortho Dr Calvin Sue , [address], [address], [place] [address]. Needs [employer] referral. NPI #  [id number].    Please fax referral to Claude at .

## 2025-07-23 NOTE — TELEPHONE ENCOUNTER
Patient has an appointment this morning and needs this referral done ASAP.  The office will not see patient unless this is completed.

## 2025-07-23 NOTE — TELEPHONE ENCOUNTER
[facility] orthopedics called to report the patient missed his appointment for today due the referral issue. They would like a hard copy of the authorization to be fax it to both fax number   [phone]

## 2025-07-23 NOTE — TELEPHONE ENCOUNTER
Spoke max/Claude advised the reason why we couldn't do insurance referral is because PCP is not listed on his insurance card.  She will help him change the PCP on his insurance card for future appointments within [facility].  No further action required  Whit Hodges

## 2025-07-24 ENCOUNTER — TELEPHONE (OUTPATIENT)
Dept: OTHER | Facility: OTHER | Age: 55
End: 2025-07-24

## 2025-07-24 DIAGNOSIS — S32.010A CLOSED COMPRESSION FRACTURE OF BODY OF L1 VERTEBRA (HCC): Primary | ICD-10-CM

## 2025-07-24 NOTE — TELEPHONE ENCOUNTER
Claude called back from [facility] and advised that Dr Calvillo was updated as PCP with insurance yesterday.    Orthopedic order is in chart. Please do insurance referral and fax to Claude at  and ortho Dr Sue office .

## 2025-07-24 NOTE — TELEPHONE ENCOUNTER
Called patient cell #- voicemail full. Called other # listed for patient- no answer. Patient needs to call customer service # on back of his insurance card and have PCP switched to Dr Ildefonso Calvillo so that referral can be done.

## 2025-07-24 NOTE — TELEPHONE ENCOUNTER
Checked avality- PCP has not been changed as yet. Called Claude at patient's day program to remind that PCP needs to be changed with [employer] so referral can be processed. She was not in as yet, left message to return call.

## 2025-07-24 NOTE — TELEPHONE ENCOUNTER
"Patient states his insurance company is requiring his PCP's name be on his card. When asked how he could help achieve this, he states he already did it. States \"can you please leave a message for Dr. Calvillo? Just let him know Sergey will be down there to see him on the 28th.\":   "

## 2025-07-28 ENCOUNTER — OFFICE VISIT (OUTPATIENT)
Dept: FAMILY MEDICINE CLINIC | Facility: CLINIC | Age: 55
End: 2025-07-28
Payer: MEDICAID

## 2025-07-28 VITALS
BODY MASS INDEX: 22.8 KG/M2 | WEIGHT: 172 LBS | RESPIRATION RATE: 12 BRPM | DIASTOLIC BLOOD PRESSURE: 82 MMHG | HEART RATE: 90 BPM | TEMPERATURE: 98.2 F | OXYGEN SATURATION: 98 % | SYSTOLIC BLOOD PRESSURE: 128 MMHG | HEIGHT: 73 IN

## 2025-07-28 DIAGNOSIS — F41.8 ANXIETY WITH DEPRESSION: ICD-10-CM

## 2025-07-28 DIAGNOSIS — Z12.5 PROSTATE CANCER SCREENING: ICD-10-CM

## 2025-07-28 DIAGNOSIS — E78.5 BORDERLINE HYPERLIPIDEMIA: ICD-10-CM

## 2025-07-28 DIAGNOSIS — N40.0 BENIGN PROSTATIC HYPERPLASIA WITHOUT LOWER URINARY TRACT SYMPTOMS: ICD-10-CM

## 2025-07-28 DIAGNOSIS — Z13.0 SCREENING, IRON DEFICIENCY ANEMIA: ICD-10-CM

## 2025-07-28 DIAGNOSIS — R97.20 ELEVATED PSA MEASUREMENT: ICD-10-CM

## 2025-07-28 DIAGNOSIS — S06.5XAA BILATERAL SUBDURAL HEMATOMAS (HCC): ICD-10-CM

## 2025-07-28 DIAGNOSIS — S32.010A CLOSED COMPRESSION FRACTURE OF BODY OF L1 VERTEBRA (HCC): ICD-10-CM

## 2025-07-28 DIAGNOSIS — R19.4 CHANGE IN BOWEL HABIT: ICD-10-CM

## 2025-07-28 DIAGNOSIS — I10 PRIMARY HYPERTENSION: ICD-10-CM

## 2025-07-28 DIAGNOSIS — R56.9 SEIZURE (HCC): ICD-10-CM

## 2025-07-28 DIAGNOSIS — M25.572 CHRONIC PAIN OF LEFT ANKLE: Primary | ICD-10-CM

## 2025-07-28 DIAGNOSIS — D72.819 LEUKOPENIA, UNSPECIFIED TYPE: ICD-10-CM

## 2025-07-28 DIAGNOSIS — E55.9 VITAMIN D DEFICIENCY: ICD-10-CM

## 2025-07-28 DIAGNOSIS — E53.8 B12 DEFICIENCY: ICD-10-CM

## 2025-07-28 DIAGNOSIS — G89.29 CHRONIC PAIN OF LEFT ANKLE: Primary | ICD-10-CM

## 2025-07-28 DIAGNOSIS — E87.8 ELECTROLYTE ABNORMALITY: ICD-10-CM

## 2025-07-28 DIAGNOSIS — Z13.29 THYROID DISORDER SCREENING: ICD-10-CM

## 2025-07-28 DIAGNOSIS — F51.01 PRIMARY INSOMNIA: ICD-10-CM

## 2025-07-28 PROBLEM — M25.511 CHRONIC RIGHT SHOULDER PAIN: Status: RESOLVED | Noted: 2025-01-24 | Resolved: 2025-07-28

## 2025-07-28 PROCEDURE — 99214 OFFICE O/P EST MOD 30 MIN: CPT | Performed by: STUDENT IN AN ORGANIZED HEALTH CARE EDUCATION/TRAINING PROGRAM

## 2025-07-28 NOTE — TELEPHONE ENCOUNTER
[facility] called to see why insurance referral was not done yet. Patient has updated his pcp on his insurance card. Please, fax referral to [phone]. It is for Dr. Simpson for a sprain on his foot. She can't schedule with Dr. Simpson until she has the referral. I see the message from [facility] that this is done in the office. Call Claude back once completed.

## 2025-07-28 NOTE — TELEPHONE ENCOUNTER
Submitted insurance referral request.  Faxed copy to Claude and scanned into patient's chart.  Awaiting response from insurance company. No further action required  Whit Hodges

## 2025-07-29 ENCOUNTER — TELEPHONE (OUTPATIENT)
Dept: FAMILY MEDICINE CLINIC | Facility: CLINIC | Age: 55
End: 2025-07-29

## 2025-07-29 RX ORDER — CHOLECALCIFEROL (VITAMIN D3) 125 MCG
TABLET ORAL
Qty: 90 TABLET | Refills: 3 | OUTPATIENT
Start: 2025-07-29

## 2025-07-31 ENCOUNTER — TELEPHONE (OUTPATIENT)
Age: 55
End: 2025-07-31

## 2025-08-07 DIAGNOSIS — S06.5XAA BILATERAL SUBDURAL HEMATOMAS (HCC): Primary | ICD-10-CM
